# Patient Record
Sex: FEMALE | Employment: OTHER | URBAN - METROPOLITAN AREA
[De-identification: names, ages, dates, MRNs, and addresses within clinical notes are randomized per-mention and may not be internally consistent; named-entity substitution may affect disease eponyms.]

---

## 2017-09-26 ENCOUNTER — HOSPITAL ENCOUNTER (OUTPATIENT)
Dept: MAMMOGRAPHY | Age: 82
Discharge: HOME | End: 2017-09-26
Attending: SPECIALIST
Payer: MEDICARE

## 2017-09-26 DIAGNOSIS — M81.0 SENILE OSTEOPOROSIS: ICD-10-CM

## 2017-09-26 PROCEDURE — 77080 DXA BONE DENSITY AXIAL: CPT

## 2018-05-07 ENCOUNTER — DOCUMENTATION ONLY (OUTPATIENT)
Dept: NEUROLOGY | Age: 83
End: 2018-05-07

## 2018-05-07 ENCOUNTER — OFFICE VISIT (OUTPATIENT)
Dept: NEUROLOGY | Age: 83
End: 2018-05-07

## 2018-05-07 ENCOUNTER — TELEPHONE (OUTPATIENT)
Dept: NEUROLOGY | Age: 83
End: 2018-05-07

## 2018-05-07 VITALS
OXYGEN SATURATION: 98 % | SYSTOLIC BLOOD PRESSURE: 136 MMHG | BODY MASS INDEX: 19.15 KG/M2 | DIASTOLIC BLOOD PRESSURE: 58 MMHG | HEART RATE: 62 BPM | RESPIRATION RATE: 18 BRPM | WEIGHT: 122 LBS | HEIGHT: 67 IN

## 2018-05-07 DIAGNOSIS — R26.81 GAIT INSTABILITY: ICD-10-CM

## 2018-05-07 DIAGNOSIS — M21.371 FOOT DROP, BILATERAL: ICD-10-CM

## 2018-05-07 DIAGNOSIS — E11.8 CONTROLLED TYPE 2 DIABETES MELLITUS WITH COMPLICATION, WITHOUT LONG-TERM CURRENT USE OF INSULIN (HCC): ICD-10-CM

## 2018-05-07 DIAGNOSIS — R29.6 RECURRENT FALLS: Primary | ICD-10-CM

## 2018-05-07 DIAGNOSIS — R20.2 PARESTHESIA OF BOTH FEET: ICD-10-CM

## 2018-05-07 DIAGNOSIS — M21.372 FOOT DROP, BILATERAL: ICD-10-CM

## 2018-05-07 RX ORDER — METFORMIN HYDROCHLORIDE 500 MG/1
TABLET ORAL DAILY
COMMUNITY
End: 2019-12-16 | Stop reason: DRUGHIGH

## 2018-05-07 RX ORDER — DOCUSATE SODIUM 100 MG/1
100 CAPSULE, LIQUID FILLED ORAL 2 TIMES DAILY
COMMUNITY

## 2018-05-07 RX ORDER — NORTRIPTYLINE HYDROCHLORIDE 25 MG/1
25 CAPSULE ORAL
COMMUNITY
End: 2019-12-16 | Stop reason: DRUGHIGH

## 2018-05-07 RX ORDER — ATORVASTATIN CALCIUM 10 MG/1
10 TABLET, FILM COATED ORAL DAILY
COMMUNITY

## 2018-05-07 RX ORDER — LANOLIN ALCOHOL/MO/W.PET/CERES
1000 CREAM (GRAM) TOPICAL DAILY
COMMUNITY

## 2018-05-07 RX ORDER — ASPIRIN 81 MG/1
1 TABLET ORAL EVERY OTHER DAY
COMMUNITY

## 2018-05-07 RX ORDER — LORAZEPAM 0.5 MG/1
0.25 TABLET ORAL 2 TIMES DAILY
COMMUNITY

## 2018-05-07 RX ORDER — RALOXIFENE HYDROCHLORIDE 60 MG/1
60 TABLET, FILM COATED ORAL DAILY
COMMUNITY

## 2018-05-07 NOTE — TELEPHONE ENCOUNTER
----- Message from [employer] sent at 5/7/2018 12:11 PM EDT -----  Regarding: Dr. Sandee Man  Pt's daughter(Candace Lebron Murray) returned nurse call regarding scheduling an appt for an EMG. Best contact number [phone].

## 2018-05-07 NOTE — MR AVS SNAPSHOT
[place] [address] [address] [place] [address] 
[phone] Patient: Daphnie Hernandez MRN: [id number] :1935 Visit Information Date & Time Provider Department Dept. Phone Encounter #  
 2018  9:00 AM Freya Dubois DO [facility] at [address] [id number] Upcoming Health Maintenance Date Due DTaP/Tdap/Td series (1 - Tdap) 1956 ZOSTER VACCINE AGE 60> 1996 GLAUCOMA SCREENING Q2Y 2001 Pneumococcal 65+ Low/Medium Risk (1 of 2 - PCV13) 2001 MEDICARE YEARLY EXAM 3/20/2018 Influenza Age 5 to Adult 2018 Allergies as of 2018  Review Complete On: 2018 By: Freya Dubois,  No Known Allergies Current Immunizations  Never Reviewed No immunizations on file. Not reviewed this visit You Were Diagnosed With   
  
 Codes Comments Recurrent falls    -  Primary ICD-10-CM: R29.6 ICD-9-CM: V15.88 Gait instability     ICD-10-CM: R26.81 
ICD-9-CM: 908. 2 Paresthesia of both feet     ICD-10-CM: R20.2 ICD-9-CM: 782.0 Foot drop, bilateral     ICD-10-CM: M21.371, M21.372 ICD-9-CM: 736.79 Controlled type 2 diabetes mellitus with complication, without long-term current use of insulin (HCC)     ICD-10-CM: E11.8 ICD-9-CM: 250.90 Vitals BP Pulse Resp Height(growth percentile) Weight(growth percentile) SpO2  
 136/58 62 18 5' 7\" (1.702 m) 122 lb (55.3 kg) 98% BMI OB Status Smoking Status 19.11 kg/m2 Postmenopausal Never Smoker Vitals History BMI and BSA Data Body Mass Index Body Surface Area  
 19.11 kg/m 2 1.62 m 2 Your Updated Medication List  
  
   
This list is accurate as of 18  9:26 AM.  Always use your most recent med list.  
  
  
  
  
 ASPIRIN PO Take  by mouth every other day. ATIVAN 0.5 mg tablet Generic drug:  LORazepam  
 Take  by mouth two (2) times a day. atorvastatin 10 mg tablet Commonly known as:  LIPITOR Take  by mouth daily. BEANO PO Take  by mouth. CALCIUM 500 PO Take  by mouth.  
  
 cyanocobalamin 1,000 mcg tablet Take 1,000 mcg by mouth daily. docusate sodium 100 mg capsule Commonly known as:  Idella Razor Take 100 mg by mouth two (2) times a day. FIBERCON PO Take  by mouth. 2 tabs daily  
  
 metFORMIN 500 mg tablet Commonly known as:  GLUCOPHAGE Take  by mouth daily. miscellaneous medical supply Misc B/L carbon fiber AFO for foot drop  
  
 nortriptyline 25 mg capsule Commonly known as:  PAMELOR Take 25 mg by mouth nightly. 1 cap at dinner. 3 at bedtime. raloxifene 60 mg tablet Commonly known as:  EVISTA Take  by mouth daily. Estela Isaacs Walker with wheels/seat Prescriptions Printed Refills  
 miscellaneous medical supply misc 0 Sig: B/L carbon fiber AFO for foot drop Class: Print Walker misc 0 [place] with wheels/seat Class: Print We Performed the Following HEMOGLOBIN A1C WITH EAG [23925 CPT(R)] IMMUNOELECTROPHORESIS [facility].) [id number] CPT(R)] METHYLMALONIC ACID [62481 CPT(R)] REFERRAL TO PHYSICAL THERAPY [QNQ45 Custom] Comments:  
 Please evaluate patient for gait training, safety THYROID PANEL W/TSH [07818 CPT(R)] VITAMIN B12 [id number] CPT(R)] To-Do List   
 05/07/2018 Neurology:  EMG LIMITED Referral Information Referral ID Referred By Referred To  
  
 [id number] Yomaira BOWER Not Available Visits Status Start Date End Date 1 New Request 5/7/18 5/7/19 If your referral has a status of pending review or denied, additional information will be sent to support the outcome of this decision. Patient Instructions A Healthy Lifestyle: Care Instructions Your Care Instructions A healthy lifestyle can help you feel good, stay at a healthy weight, and have plenty of energy for both work and play. A healthy lifestyle is something you can share with your whole family. A healthy lifestyle also can lower your risk for serious health problems, such as high blood pressure, heart disease, and diabetes. You can follow a few steps listed below to improve your health and the health of your family. Follow-up care is a key part of your treatment and safety. Be sure to make and go to all appointments, and call your doctor if you are having problems. It's also a good idea to know your test results and keep a list of the medicines you take. How can you care for yourself at home? · Do not eat too much sugar, fat, or fast foods. You can still have dessert and treats now and then. The goal is moderation. · Start small to improve your eating habits. Pay attention to portion sizes, drink less juice and soda pop, and eat more fruits and vegetables. ¨ Eat a healthy amount of food. A 3-ounce serving of meat, for example, is about the size of a deck of cards. Fill the rest of your plate with vegetables and whole grains. ¨ Limit the amount of soda and sports drinks you have every day. Drink more water when you are thirsty. ¨ Eat at least 5 servings of fruits and vegetables every day. It may seem like a lot, but it is not hard to reach this goal. A serving or helping is 1 piece of fruit, 1 cup of vegetables, or 2 cups of leafy, raw vegetables. Have an apple or some carrot sticks as an afternoon snack instead of a candy bar. Try to have fruits and/or vegetables at every meal. 
· Make exercise part of your daily routine. You may want to start with simple activities, such as walking, bicycling, or slow swimming. Try to be active 30 to 60 minutes every day. You do not need to do all 30 to 60 minutes all at once.  For example, you can exercise 3 times a day for 10 or 20 minutes. Moderate exercise is safe for most people, but it is always a good idea to talk to your doctor before starting an exercise program. 
· Keep moving. Iris Zariine the lawn, work in the garden, or [employer]. Take the stairs instead of the elevator at work. · If you smoke, quit. People who smoke have an increased risk for heart attack, stroke, cancer, and other lung illnesses. Quitting is hard, but there are ways to boost your chance of quitting tobacco for good. ¨ Use nicotine gum, patches, or lozenges. ¨ Ask your doctor about stop-smoking programs and medicines. ¨ Keep trying. In addition to reducing your risk of diseases in the future, you will notice some benefits soon after you stop using tobacco. If you have shortness of breath or asthma symptoms, they will likely get better within a few weeks after you quit. · Limit how much alcohol you drink. Moderate amounts of alcohol (up to 2 drinks a day for men, 1 drink a day for women) are okay. But drinking too much can lead to liver problems, high blood pressure, and other health problems. Family health If you have a family, there are many things you can do together to improve your health. · Eat meals together as a family as often as possible. · Eat healthy foods. This includes fruits, vegetables, lean meats and dairy, and whole grains. · Include your family in your fitness plan. Most people think of activities such as jogging or tennis as the way to fitness, but there are many ways you and your family can be more active. Anything that makes you breathe hard and gets your heart pumping is exercise. Here are some tips: 
¨ Walk to do errands or to take your child to school or the bus. ¨ Go for a family bike ride after dinner instead of watching TV. Where can you learn more? Go to http://grayson-radha.info/. Enter O849 in the search box to learn more about \"A Healthy Lifestyle: Care Instructions. \" Current as of: May 12, 2017 Content Version: 11.4 © 2175-9521 Healthwise, [place]. Care instructions adapted under license by [employer] (which disclaims liability or warranty for this information). If you have questions about a medical condition or this instruction, always ask your healthcare professional. [address] any warranty or liability for your use of this information. Introducing [facility] HE[facility]! [facility] introduces [employer] patient portal. Now you can access parts of your medical record, email your doctor's office, and request medication refills online. 1. In your internet browser, go to https://[employer]. [employer] 2. Click on the First Time User? Click Here link in the Sign In box. You will see the New Member Sign Up page. 3. Enter your [employer] Access Code exactly as it appears below. You will not need to use this code after youve completed the sign-up process. If you do not sign up before the expiration date, you must request a new code. · [employer] Access Code: [email or web address] Expires: 8/5/2018  8:07 AM 
 
4. Enter the last four digits of your Social Security Number (xxxx) and Date of Birth (mm/dd/yyyy) as indicated and click Submit. You will be taken to the next sign-up page. 5. Create a [employer] ID. This will be your [employer] login ID and cannot be changed, so think of one that is secure and easy to remember. 6. Create a [employer] password. You can change your password at any time. 7. Enter your Password Reset Question and Answer. This can be used at a later time if you forget your password. 8. Enter your e-mail address. You will receive e-mail notification when new information is available in [address] 9. Click Sign Up. You can now view and download portions of your medical record. 10. Click the Download Summary menu link to download a portable copy of your medical information. If you have questions, please visit the Frequently Asked Questions section of the [employer] website. Remember, [employer] is NOT to be used for urgent needs. For medical emergencies, dial 911. Now available from your iPhone and Android! Please provide this summary of care documentation to your next provider. Your primary care clinician is listed as Ana Maria Rapp. If you have any questions after today's visit, please call [phone].

## 2018-05-07 NOTE — PATIENT INSTRUCTIONS
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
## 2018-05-09 LAB
EST. AVERAGE GLUCOSE BLD GHB EST-MCNC: 117 MG/DL
FT4I SERPL CALC-MCNC: 1.9 (ref 1.2–4.9)
HBA1C MFR BLD: 5.7 % (ref 4.8–5.6)
IGA SERPL-MCNC: 126 MG/DL (ref 64–422)
IGG SERPL-MCNC: 800 MG/DL (ref 700–1600)
IGM SERPL-MCNC: 40 MG/DL (ref 26–217)
METHYLMALONATE SERPL-SCNC: 107 NMOL/L (ref 0–378)
PROT PATTERN SERPL IFE-IMP: NORMAL
T3RU NFR SERPL: 29 % (ref 24–39)
T4 SERPL-MCNC: 6.4 UG/DL (ref 4.5–12)
TSH SERPL DL<=0.005 MIU/L-ACNC: 2.99 UIU/ML (ref 0.45–4.5)
VIT B12 SERPL-MCNC: >2000 PG/ML (ref 232–1245)

## 2018-05-17 ENCOUNTER — TELEPHONE (OUTPATIENT)
Dept: NEUROLOGY | Age: 83
End: 2018-05-17

## 2018-05-17 NOTE — TELEPHONE ENCOUNTER
Pt's daughter calling records sent to Dr. Jenny Prasad at [facility]. Fax: [phone]  Phone number [phone]

## 2018-05-18 NOTE — TELEPHONE ENCOUNTER
----- Message from Ami Palomino sent at 5/18/2018 10:09 AM EDT -----  Regarding: Dr Bishop Birch  Pt's daughter Mario Spring c) [phone], said that her mother needs a walker with a seat and  she said she found a company called [employer] p) [phone], also can call them for fax number, that can supply her mother walker with the seat, they just would need recent  office sent to them. Pt said she will need this as soon as possible  due to the falls her mother has been having, Pt daughter she can also stop by today and pick them up , if ready today, if you call her when ready.   Also gave permission to leave a voice mail message      will call back with the fax number if needed,

## 2019-10-02 ENCOUNTER — HOSPITAL ENCOUNTER (OUTPATIENT)
Dept: CT IMAGING | Age: 84
Discharge: HOME | End: 2019-10-02
Attending: SPECIALIST
Payer: MEDICARE

## 2019-10-02 DIAGNOSIS — R41.0 ACUTE CONFUSION: ICD-10-CM

## 2019-10-02 DIAGNOSIS — R29.6 FREQUENT FALLS: ICD-10-CM

## 2019-10-02 LAB — CREAT BLD-MCNC: 0.6 MG/DL (ref 0.6–1.3)

## 2019-10-02 PROCEDURE — 74011636320 HC RX REV CODE- 636/320: Performed by: SPECIALIST

## 2019-10-02 PROCEDURE — 70470 CT HEAD/BRAIN W/O & W/DYE: CPT

## 2019-10-02 PROCEDURE — 82565 ASSAY OF CREATININE: CPT

## 2019-10-02 RX ORDER — SODIUM CHLORIDE 0.9 % (FLUSH) 0.9 %
10 SYRINGE (ML) INJECTION
Status: COMPLETED | OUTPATIENT
Start: 2019-10-02 | End: 2019-10-02

## 2019-10-02 RX ADMIN — IOPAMIDOL 100 ML: 755 INJECTION, SOLUTION INTRAVENOUS at 13:14

## 2019-10-02 RX ADMIN — Medication 10 ML: at 13:14

## 2019-12-13 ENCOUNTER — HOSPITAL ENCOUNTER (OUTPATIENT)
Dept: VASCULAR SURGERY | Age: 84
Discharge: HOME | DRG: 638 | End: 2019-12-13
Attending: SPECIALIST
Payer: MEDICARE

## 2019-12-13 DIAGNOSIS — M79.606 LEG PAIN: ICD-10-CM

## 2019-12-13 DIAGNOSIS — R22.43 LOCALIZED SWELLING, MASS AND LUMP, LOWER LIMB, BILATERAL: ICD-10-CM

## 2019-12-13 DIAGNOSIS — L53.9 ERYTHEMATOUS CONDITION: ICD-10-CM

## 2019-12-13 PROCEDURE — 93970 EXTREMITY STUDY: CPT

## 2019-12-16 ENCOUNTER — HOSPITAL ENCOUNTER (INPATIENT)
Age: 84
LOS: 8 days | Discharge: NURSING, REHAB OR LONG-TERM CARE | DRG: 638 | End: 2019-12-24
Attending: EMERGENCY MEDICINE | Admitting: INTERNAL MEDICINE
Payer: MEDICARE

## 2019-12-16 ENCOUNTER — APPOINTMENT (OUTPATIENT)
Dept: GENERAL RADIOLOGY | Age: 84
DRG: 638 | End: 2019-12-16
Attending: EMERGENCY MEDICINE
Payer: MEDICARE

## 2019-12-16 DIAGNOSIS — R60.0 BILATERAL LEG EDEMA: ICD-10-CM

## 2019-12-16 DIAGNOSIS — L97.519 ULCER OF RIGHT FOOT, UNSPECIFIED ULCER STAGE (HCC): ICD-10-CM

## 2019-12-16 DIAGNOSIS — L03.116 BILATERAL LOWER LEG CELLULITIS: Primary | ICD-10-CM

## 2019-12-16 DIAGNOSIS — L03.115 BILATERAL LOWER LEG CELLULITIS: Primary | ICD-10-CM

## 2019-12-16 DIAGNOSIS — E11.65 UNCONTROLLED TYPE 2 DIABETES MELLITUS WITH HYPERGLYCEMIA (HCC): ICD-10-CM

## 2019-12-16 DIAGNOSIS — I87.2 VENOUS INSUFFICIENCY: ICD-10-CM

## 2019-12-16 LAB
ALBUMIN SERPL-MCNC: 3 G/DL (ref 3.5–5)
ALBUMIN/GLOB SERPL: 0.8 {RATIO} (ref 1.1–2.2)
ALP SERPL-CCNC: 66 U/L (ref 45–117)
ALT SERPL-CCNC: 32 U/L (ref 12–78)
ANION GAP SERPL CALC-SCNC: 1 MMOL/L (ref 5–15)
AST SERPL-CCNC: 34 U/L (ref 15–37)
BASOPHILS # BLD: 0.1 K/UL (ref 0–0.1)
BASOPHILS NFR BLD: 1 % (ref 0–1)
BILIRUB SERPL-MCNC: 0.3 MG/DL (ref 0.2–1)
BNP SERPL-MCNC: 458 PG/ML
BUN SERPL-MCNC: 26 MG/DL (ref 6–20)
BUN/CREAT SERPL: 27 (ref 12–20)
CALCIUM SERPL-MCNC: 8.7 MG/DL (ref 8.5–10.1)
CHLORIDE SERPL-SCNC: 102 MMOL/L (ref 97–108)
CO2 SERPL-SCNC: 31 MMOL/L (ref 21–32)
CREAT SERPL-MCNC: 0.96 MG/DL (ref 0.55–1.02)
DIFFERENTIAL METHOD BLD: ABNORMAL
EOSINOPHIL # BLD: 1.3 K/UL (ref 0–0.4)
EOSINOPHIL NFR BLD: 12 % (ref 0–7)
ERYTHROCYTE [DISTWIDTH] IN BLOOD BY AUTOMATED COUNT: 13.2 % (ref 11.5–14.5)
GLOBULIN SER CALC-MCNC: 3.7 G/DL (ref 2–4)
GLUCOSE BLD STRIP.AUTO-MCNC: 139 MG/DL (ref 65–100)
GLUCOSE SERPL-MCNC: 102 MG/DL (ref 65–100)
HCT VFR BLD AUTO: 35.1 % (ref 35–47)
HGB BLD-MCNC: 11.2 G/DL (ref 11.5–16)
IMM GRANULOCYTES # BLD AUTO: 0 K/UL (ref 0–0.04)
IMM GRANULOCYTES NFR BLD AUTO: 0 % (ref 0–0.5)
LACTATE BLD-SCNC: 0.69 MMOL/L (ref 0.4–2)
LYMPHOCYTES # BLD: 1.9 K/UL (ref 0.8–3.5)
LYMPHOCYTES NFR BLD: 18 % (ref 12–49)
MAGNESIUM SERPL-MCNC: 2.2 MG/DL (ref 1.6–2.4)
MCH RBC QN AUTO: 32 PG (ref 26–34)
MCHC RBC AUTO-ENTMCNC: 31.9 G/DL (ref 30–36.5)
MCV RBC AUTO: 100.3 FL (ref 80–99)
MONOCYTES # BLD: 1.1 K/UL (ref 0–1)
MONOCYTES NFR BLD: 10 % (ref 5–13)
NEUTS SEG # BLD: 6.4 K/UL (ref 1.8–8)
NEUTS SEG NFR BLD: 59 % (ref 32–75)
NRBC # BLD: 0 K/UL (ref 0–0.01)
NRBC BLD-RTO: 0 PER 100 WBC
PLATELET # BLD AUTO: 393 K/UL (ref 150–400)
PMV BLD AUTO: 9.3 FL (ref 8.9–12.9)
POTASSIUM SERPL-SCNC: 4.5 MMOL/L (ref 3.5–5.1)
PROT SERPL-MCNC: 6.7 G/DL (ref 6.4–8.2)
RBC # BLD AUTO: 3.5 M/UL (ref 3.8–5.2)
RBC MORPH BLD: ABNORMAL
SERVICE CMNT-IMP: ABNORMAL
SODIUM SERPL-SCNC: 134 MMOL/L (ref 136–145)
WBC # BLD AUTO: 10.8 K/UL (ref 3.6–11)

## 2019-12-16 PROCEDURE — 74011250636 HC RX REV CODE- 250/636: Performed by: INTERNAL MEDICINE

## 2019-12-16 PROCEDURE — 82962 GLUCOSE BLOOD TEST: CPT

## 2019-12-16 PROCEDURE — 74011000258 HC RX REV CODE- 258: Performed by: EMERGENCY MEDICINE

## 2019-12-16 PROCEDURE — 74011250637 HC RX REV CODE- 250/637: Performed by: INTERNAL MEDICINE

## 2019-12-16 PROCEDURE — 36415 COLL VENOUS BLD VENIPUNCTURE: CPT

## 2019-12-16 PROCEDURE — 83605 ASSAY OF LACTIC ACID: CPT

## 2019-12-16 PROCEDURE — 74011000258 HC RX REV CODE- 258: Performed by: INTERNAL MEDICINE

## 2019-12-16 PROCEDURE — 85025 COMPLETE CBC W/AUTO DIFF WBC: CPT

## 2019-12-16 PROCEDURE — 80053 COMPREHEN METABOLIC PANEL: CPT

## 2019-12-16 PROCEDURE — 71046 X-RAY EXAM CHEST 2 VIEWS: CPT

## 2019-12-16 PROCEDURE — 74011250636 HC RX REV CODE- 250/636: Performed by: EMERGENCY MEDICINE

## 2019-12-16 PROCEDURE — 96374 THER/PROPH/DIAG INJ IV PUSH: CPT

## 2019-12-16 PROCEDURE — 83735 ASSAY OF MAGNESIUM: CPT

## 2019-12-16 PROCEDURE — 83880 ASSAY OF NATRIURETIC PEPTIDE: CPT

## 2019-12-16 PROCEDURE — 65660000000 HC RM CCU STEPDOWN

## 2019-12-16 PROCEDURE — 87040 BLOOD CULTURE FOR BACTERIA: CPT

## 2019-12-16 PROCEDURE — 99284 EMERGENCY DEPT VISIT MOD MDM: CPT

## 2019-12-16 RX ORDER — TORSEMIDE 20 MG/1
5 TABLET ORAL EVERY EVENING
Status: DISCONTINUED | OUTPATIENT
Start: 2019-12-16 | End: 2019-12-24 | Stop reason: HOSPADM

## 2019-12-16 RX ORDER — METFORMIN HYDROCHLORIDE 500 MG/1
500 TABLET, FILM COATED, EXTENDED RELEASE ORAL DAILY
COMMUNITY
End: 2019-12-24

## 2019-12-16 RX ORDER — INSULIN LISPRO 100 [IU]/ML
INJECTION, SOLUTION INTRAVENOUS; SUBCUTANEOUS
Status: DISCONTINUED | OUTPATIENT
Start: 2019-12-16 | End: 2019-12-18

## 2019-12-16 RX ORDER — LORAZEPAM 0.5 MG/1
0.25 TABLET ORAL 2 TIMES DAILY
Status: DISCONTINUED | OUTPATIENT
Start: 2019-12-16 | End: 2019-12-24 | Stop reason: HOSPADM

## 2019-12-16 RX ORDER — CALCIUM CARBONATE 500(1250)
500 TABLET ORAL DAILY
Status: DISCONTINUED | OUTPATIENT
Start: 2019-12-17 | End: 2019-12-24 | Stop reason: HOSPADM

## 2019-12-16 RX ORDER — SULFAMETHOXAZOLE AND TRIMETHOPRIM 800; 160 MG/1; MG/1
1 TABLET ORAL 2 TIMES DAILY
COMMUNITY

## 2019-12-16 RX ORDER — LISINOPRIL 40 MG/1
40 TABLET ORAL DAILY
Status: DISCONTINUED | OUTPATIENT
Start: 2019-12-17 | End: 2019-12-24 | Stop reason: HOSPADM

## 2019-12-16 RX ORDER — DOCUSATE SODIUM 100 MG/1
100 CAPSULE, LIQUID FILLED ORAL 2 TIMES DAILY
Status: DISCONTINUED | OUTPATIENT
Start: 2019-12-16 | End: 2019-12-24 | Stop reason: HOSPADM

## 2019-12-16 RX ORDER — LISINOPRIL 20 MG/1
40 TABLET ORAL DAILY
COMMUNITY

## 2019-12-16 RX ORDER — RISPERIDONE 0.25 MG/1
0.25 TABLET, FILM COATED ORAL EVERY OTHER DAY
Status: DISCONTINUED | OUTPATIENT
Start: 2019-12-17 | End: 2019-12-24 | Stop reason: HOSPADM

## 2019-12-16 RX ORDER — DOCUSATE SODIUM 100 MG/1
100 CAPSULE, LIQUID FILLED ORAL
Status: DISCONTINUED | OUTPATIENT
Start: 2019-12-16 | End: 2019-12-24 | Stop reason: HOSPADM

## 2019-12-16 RX ORDER — CALCIUM POLYCARBOPHIL 625 MG
1 TABLET ORAL 2 TIMES DAILY
Status: DISCONTINUED | OUTPATIENT
Start: 2019-12-16 | End: 2019-12-24 | Stop reason: HOSPADM

## 2019-12-16 RX ORDER — HEPARIN SODIUM 5000 [USP'U]/ML
5000 INJECTION, SOLUTION INTRAVENOUS; SUBCUTANEOUS EVERY 12 HOURS
Status: DISCONTINUED | OUTPATIENT
Start: 2019-12-16 | End: 2019-12-24 | Stop reason: HOSPADM

## 2019-12-16 RX ORDER — RISPERIDONE 0.25 MG/1
0.25 TABLET, FILM COATED ORAL EVERY OTHER DAY
COMMUNITY

## 2019-12-16 RX ORDER — SODIUM CHLORIDE 0.9 % (FLUSH) 0.9 %
5-40 SYRINGE (ML) INJECTION EVERY 8 HOURS
Status: DISCONTINUED | OUTPATIENT
Start: 2019-12-16 | End: 2019-12-24 | Stop reason: HOSPADM

## 2019-12-16 RX ORDER — ASPIRIN 81 MG/1
81 TABLET ORAL EVERY OTHER DAY
Status: DISCONTINUED | OUTPATIENT
Start: 2019-12-17 | End: 2019-12-24 | Stop reason: HOSPADM

## 2019-12-16 RX ORDER — ACETAMINOPHEN 325 MG/1
650 TABLET ORAL
Status: DISCONTINUED | OUTPATIENT
Start: 2019-12-16 | End: 2019-12-24 | Stop reason: HOSPADM

## 2019-12-16 RX ORDER — ONDANSETRON 2 MG/ML
4 INJECTION INTRAMUSCULAR; INTRAVENOUS
Status: DISCONTINUED | OUTPATIENT
Start: 2019-12-16 | End: 2019-12-24 | Stop reason: HOSPADM

## 2019-12-16 RX ORDER — CEPHALEXIN 500 MG/1
500 CAPSULE ORAL 2 TIMES DAILY
COMMUNITY
End: 2019-12-24

## 2019-12-16 RX ORDER — NORTRIPTYLINE HYDROCHLORIDE 25 MG/1
75 CAPSULE ORAL
Status: DISCONTINUED | OUTPATIENT
Start: 2019-12-16 | End: 2019-12-24 | Stop reason: HOSPADM

## 2019-12-16 RX ORDER — DEXTROSE MONOHYDRATE 25 G/50ML
12.5-25 INJECTION, SOLUTION INTRAVENOUS AS NEEDED
Status: DISCONTINUED | OUTPATIENT
Start: 2019-12-16 | End: 2019-12-24 | Stop reason: HOSPADM

## 2019-12-16 RX ORDER — ATORVASTATIN CALCIUM 10 MG/1
10 TABLET, FILM COATED ORAL DAILY
Status: DISCONTINUED | OUTPATIENT
Start: 2019-12-17 | End: 2019-12-24 | Stop reason: HOSPADM

## 2019-12-16 RX ORDER — NORTRIPTYLINE HYDROCHLORIDE 25 MG/1
25 CAPSULE ORAL
Status: DISCONTINUED | OUTPATIENT
Start: 2019-12-16 | End: 2019-12-24 | Stop reason: HOSPADM

## 2019-12-16 RX ORDER — TORSEMIDE 5 MG/1
5 TABLET ORAL EVERY EVENING
COMMUNITY

## 2019-12-16 RX ORDER — SODIUM CHLORIDE 0.9 % (FLUSH) 0.9 %
5-40 SYRINGE (ML) INJECTION AS NEEDED
Status: DISCONTINUED | OUTPATIENT
Start: 2019-12-16 | End: 2019-12-24 | Stop reason: HOSPADM

## 2019-12-16 RX ORDER — HEPARIN SODIUM 5000 [USP'U]/ML
5000 INJECTION, SOLUTION INTRAVENOUS; SUBCUTANEOUS EVERY 8 HOURS
Status: DISCONTINUED | OUTPATIENT
Start: 2019-12-16 | End: 2019-12-16

## 2019-12-16 RX ORDER — NORTRIPTYLINE HYDROCHLORIDE 25 MG/1
75 CAPSULE ORAL
COMMUNITY

## 2019-12-16 RX ORDER — MAGNESIUM SULFATE 100 %
4 CRYSTALS MISCELLANEOUS AS NEEDED
Status: DISCONTINUED | OUTPATIENT
Start: 2019-12-16 | End: 2019-12-24 | Stop reason: HOSPADM

## 2019-12-16 RX ORDER — NORTRIPTYLINE HYDROCHLORIDE 25 MG/1
25 CAPSULE ORAL
COMMUNITY

## 2019-12-16 RX ORDER — LANOLIN ALCOHOL/MO/W.PET/CERES
1000 CREAM (GRAM) TOPICAL DAILY
Status: DISCONTINUED | OUTPATIENT
Start: 2019-12-17 | End: 2019-12-24 | Stop reason: HOSPADM

## 2019-12-16 RX ORDER — RALOXIFENE HYDROCHLORIDE 60 MG/1
60 TABLET, FILM COATED ORAL DAILY
Status: DISCONTINUED | OUTPATIENT
Start: 2019-12-17 | End: 2019-12-24 | Stop reason: HOSPADM

## 2019-12-16 RX ADMIN — HEPARIN SODIUM 5000 UNITS: 5000 INJECTION INTRAVENOUS; SUBCUTANEOUS at 20:14

## 2019-12-16 RX ADMIN — CALCIUM POLYCARBOPHIL 625 MG: 625 TABLET, FILM COATED ORAL at 22:07

## 2019-12-16 RX ADMIN — TORSEMIDE 5 MG: 20 TABLET ORAL at 20:12

## 2019-12-16 RX ADMIN — AMPICILLIN SODIUM AND SULBACTAM SODIUM 3 G: 2; 1 INJECTION, POWDER, FOR SOLUTION INTRAMUSCULAR; INTRAVENOUS at 22:35

## 2019-12-16 RX ADMIN — Medication 10 ML: at 22:08

## 2019-12-16 RX ADMIN — VANCOMYCIN HYDROCHLORIDE 1000 MG: 1 INJECTION, POWDER, LYOPHILIZED, FOR SOLUTION INTRAVENOUS at 18:31

## 2019-12-16 RX ADMIN — DOCUSATE SODIUM 100 MG: 100 CAPSULE, LIQUID FILLED ORAL at 20:14

## 2019-12-16 RX ADMIN — NORTRIPTYLINE HYDROCHLORIDE 75 MG: 25 CAPSULE ORAL at 22:08

## 2019-12-16 RX ADMIN — AMPICILLIN SODIUM AND SULBACTAM SODIUM 3 G: 2; 1 INJECTION, POWDER, FOR SOLUTION INTRAMUSCULAR; INTRAVENOUS at 16:30

## 2019-12-16 RX ADMIN — LORAZEPAM 0.25 MG: 0.5 TABLET ORAL at 18:29

## 2019-12-16 RX ADMIN — SODIUM CHLORIDE 1000 ML: 900 INJECTION, SOLUTION INTRAVENOUS at 15:36

## 2019-12-16 RX ADMIN — NORTRIPTYLINE HYDROCHLORIDE 25 MG: 25 CAPSULE ORAL at 20:14

## 2019-12-16 NOTE — ED NOTES
TRANSFER - OUT REPORT:    Verbal report given to reema(name) on Jeanna Tnea  being transferred to gen surg(unit) for routine progression of care       Report consisted of patients Situation, Background, Assessment and   Recommendations(SBAR). Information from the following report(s) SBAR, ED Summary and MAR was reviewed with the receiving nurse. Lines:       Opportunity for questions and clarification was provided.       Patient transported with:   [employer]

## 2019-12-16 NOTE — PROGRESS NOTES
Pharmacy Automatic Renal Dosing Protocol - Antimicrobials    Indication for Antimicrobials: skin and soft tissue     Current Regimen of Each Antimicrobial:  Unasyn 3g IV every 8hr (Start Date ; Day # )  Vancomycin pharmacy to dose (start date: , day 1)    Previous Antimicrobial Therapy:    Goal Level: VANCOMYCIN TROUGH GOAL RANGE    Vancomycin Trough: 10 - 15 mcg/mL  (AUC: 400 - 600 mg/hr/Liter/day)     Date Dose & Interval Measured (mcg/mL) Extrapolated (mcg/mL)                       Date & time of next level: before 3rd maintenance dose    Significant Cultures:   : paired blood - pending    Radiology / Imaging results: (X-ray, CT scan or MRI):   : chest xray: no acute process    Paralysis, amputations, malnutrition: none    Labs:  Recent Labs     19  1514   CREA 0.96   BUN 26*   WBC 10.8     Temp (24hrs), Av.8 °F (36.6 °C), Min:97.8 °F (36.6 °C), Max:97.8 °F (36.6 °C)    Creatinine Clearance (mL/min) or Dialysis: ~37ml/min    Impression/Plan:   Continue unasyn per renal above for renal function and indication  Cbc and bmp  Antimicrobial stop date 7 days      Pharmacy will follow daily and adjust medications as appropriate for renal function and/or serum levels. Thank you,  MACKENZIE Gray    Recommended duration of therapy  [email or web address]/[place]/[facility]/Pharmacy/Clinical%20Companion/Duration%20of%20ABX%20therapy. docx    Renal Dosing  http://[facility]/[place]/[facility]/Pharmacy/Clinical%20Companion/Renal%[email or web address]. pdf

## 2019-12-16 NOTE — ED PROVIDER NOTES
EMERGENCY DEPARTMENT HISTORY AND PHYSICAL EXAM      Please note that this dictation was completed with [employer], the computer voice recognition software. Quite often unanticipated grammatical, syntax, homophones, and other interpretive errors are inadvertently transcribed by the computer software. Please disregard these errors and any errors that have escaped final proofreading. Thank you. Date: 12/16/2019  Patient Name: Sheila Kuhn  Patient Age and Sex: 81 y.o. female    History of Presenting Illness     Chief Complaint   Patient presents with    Wound Check     pts daughter reports pt has swelling in bilateral legs with wounds, had a doppler last week    Leg Swelling       History Provided By: Patient and daughter    HPI: Sheila Kuhn, 81 y.o. female with past medical history as documented below presents to the ED with c/o of one week of progressive moderate to severe bilateral lower extremity edema. Pt also reports associated redness, warmth and burning pain. Pt reports seeing her wound care doc, Dr. Rick Ferreira recently. She had recently DVT ultrasounds that were negative. She was given a Rx for Bactrim and Keflex for possible cellulitis and has been taking them thus far for about a few days without relief of sx's. Additionally, she was also prescribed torsemide 5mg daily for her leg edema. She notes a history of \"foot drop\" and has been wearing a prosthesis device that resulted in wounds in her feet. She states her pain is painful to touch and currently at 5/10 pain. Pt denies any other alleviating or exacerbating factors. Additionally, pt specifically denies any recent fever, chills, headache, nausea, vomiting, abdominal pain, CP, SOB, lightheadedness, dizziness, numbness, weakness, heart palpitations, urinary sxs, diarrhea, constipation, melena, hematochezia, cough, or congestion. There are no other complaints, changes or physical findings at this time.      PCP: Gisele Hoffman MD    Past History Past Medical History:  Past Medical History:   Diagnosis Date    Depression     Diabetes ([facility] [address]     Hypercholesteremia        Past Surgical History:  Denies    Family History:  Family History   Problem Relation Age of Onset    Dementia Maternal Grandmother        Social History:  Social History     Tobacco Use    Smoking status: Never Smoker    Smokeless tobacco: Never Used   Substance Use Topics    Alcohol use: No    Drug use: Not on file       Allergies:  No Known Allergies    Current Medications:  No current facility-administered medications on file prior to encounter. Current Outpatient Medications on File Prior to Encounter   Medication Sig Dispense Refill    metFORMIN (GLUMETZA ER) 500 mg TG24 24 hour tablet Take 500 mg by mouth daily.  nortriptyline (PAMELOR) 25 mg capsule Take 25 mg by mouth daily (with dinner).  nortriptyline (PAMELOR) 25 mg capsule Take 75 mg by mouth nightly.  lisinopril (PRINIVIL, ZESTRIL) 20 mg tablet Take 40 mg by mouth daily.  risperiDONE (RISPERDAL) 0.25 mg tablet Take 0.25 mg by mouth every other day.  torsemide (DEMADEX) 5 mg tablet Take 5 mg by mouth every evening.  cephALEXin (KEFLEX) 500 mg capsule Take 500 mg by mouth two (2) times a day.  trimethoprim-sulfamethoxazole (BACTRIM DS) 160-800 mg per tablet Take 1 Tab by mouth two (2) times a day.  LORazepam (ATIVAN) 0.5 mg tablet Take 0.25 mg by mouth two (2) times a day.  raloxifene (EVISTA) 60 mg tablet Take 60 mg by mouth daily.  calcium carbonate (CALCIUM 500 PO) Take 1 Tab by mouth daily.  docusate sodium (COLACE) 100 mg capsule Take 100 mg by mouth two (2) times a day.  aspirin delayed-release 81 mg tablet Take 1 Tab by mouth every other day.  calcium polycarbophil (FIBERCON PO) Take 1 Tab by mouth two (2) times a day.  cyanocobalamin 1,000 mcg tablet Take 1,000 mcg by mouth daily.       alpha-D-galactosidase (BEANO PO) Take 1 Tab by mouth daily.      atorvastatin (LIPITOR) 10 mg tablet Take 10 mg by mouth daily.  [DISCONTINUED] nortriptyline (PAMELOR) 25 mg capsule Take 25 mg by mouth nightly. 1 cap at dinner. 3 at bedtime.  [DISCONTINUED] metFORMIN (GLUCOPHAGE) 500 mg tablet Take  by mouth daily.  [DISCONTINUED] miscellaneous medical supply [facility] carbon fiber AFO for foot drop 2 Each 0    [DISCONTINUED] Walker misc Walker with wheels/seat 1 Each 0       Review of Systems   Review of Systems   Constitutional: Negative. Negative for chills and fever. HENT: Negative. Negative for congestion, facial swelling, rhinorrhea, sore throat, trouble swallowing and voice change. Eyes: Negative. Respiratory: Negative. Negative for apnea, cough, chest tightness, shortness of breath and wheezing. Cardiovascular: Positive for leg swelling. Negative for chest pain and palpitations. Gastrointestinal: Negative. Negative for abdominal distention, abdominal pain, blood in stool, constipation, diarrhea, nausea and vomiting. Endocrine: Negative. Negative for cold intolerance, heat intolerance and polyuria. Genitourinary: Negative. Negative for difficulty urinating, dysuria, flank pain, frequency, hematuria and urgency. Musculoskeletal: Negative. Negative for arthralgias, back pain, myalgias, neck pain and neck stiffness. Skin: Positive for rash and wound. Negative for color change. Neurological: Negative. Negative for dizziness, syncope, facial asymmetry, speech difficulty, weakness, light-headedness, numbness and headaches. Hematological: Negative. Does not bruise/bleed easily. Psychiatric/Behavioral: Negative. Negative for confusion and self-injury. The patient is not nervous/anxious. Physical Exam   Physical Exam  Vitals signs and nursing note reviewed. Constitutional:       General: She is not in acute distress. Appearance: She is well-developed. She is not diaphoretic.    HENT:      Head: Normocephalic and atraumatic. Mouth/Throat:      Pharynx: No oropharyngeal exudate. Eyes:      Conjunctiva/sclera: Conjunctivae normal.      Pupils: Pupils are equal, round, and reactive to light. Neck:      Musculoskeletal: Normal range of motion. Cardiovascular:      Rate and Rhythm: Normal rate and regular rhythm. Heart sounds: Normal heart sounds. No murmur. No friction rub. No gallop. Pulmonary:      Effort: Pulmonary effort is normal. No respiratory distress. Breath sounds: Normal breath sounds. No wheezing or rales. Chest:      Chest wall: No tenderness. Abdominal:      General: Bowel sounds are normal. There is no distension. Palpations: Abdomen is soft. There is no mass. Tenderness: There is no tenderness. There is no guarding or rebound. Musculoskeletal: Normal range of motion. General: Swelling present. No tenderness or deformity. Skin:     General: Skin is warm. Findings: Erythema (+2 edema BLE, erythema noted to distal legs, skin sloughing) present. No rash. Neurological:      Mental Status: She is alert and oriented to person, place, and time. Cranial Nerves: No cranial nerve deficit. Motor: No abnormal muscle tone.       Coordination: Coordination normal.      Deep Tendon Reflexes: Reflexes normal.         Diagnostic Study Results     Labs -  Recent Results (from the past 24 hour(s))   POC LACTIC ACID    Collection Time: 12/16/19  3:09 PM   Result Value Ref Range    Lactic Acid (POC) 0.69 0.40 - 2.00 mmol/L   CBC WITH AUTOMATED DIFF    Collection Time: 12/16/19  3:14 PM   Result Value Ref Range    WBC 10.8 3.6 - 11.0 K/uL    RBC 3.50 (L) 3.80 - 5.20 M/uL    HGB 11.2 (L) 11.5 - 16.0 g/dL    HCT 35.1 35.0 - 47.0 %    .3 (H) 80.0 - 99.0 FL    MCH 32.0 26.0 - 34.0 PG    MCHC 31.9 30.0 - 36.5 g/dL    RDW 13.2 11.5 - 14.5 %    PLATELET 097 634 - 845 K/uL    MPV 9.3 8.9 - 12.9 FL    NRBC 0.0 0  WBC    ABSOLUTE NRBC 0.00 0.00 - 0.01 K/uL    NEUTROPHILS 59 32 - 75 %    LYMPHOCYTES 18 12 - 49 %    MONOCYTES 10 5 - 13 %    EOSINOPHILS 12 (H) 0 - 7 %    BASOPHILS 1 0 - 1 %    IMMATURE GRANULOCYTES 0 0.0 - 0.5 %    ABS. NEUTROPHILS 6.4 1.8 - 8.0 K/UL    ABS. LYMPHOCYTES 1.9 0.8 - 3.5 K/UL    ABS. MONOCYTES 1.1 (H) 0.0 - 1.0 K/UL    ABS. EOSINOPHILS 1.3 (H) 0.0 - 0.4 K/UL    ABS. BASOPHILS 0.1 0.0 - 0.1 K/UL    ABS. IMM. GRANS. 0.0 0.00 - 0.04 K/UL    DF AUTOMATED      RBC COMMENTS NORMOCYTIC, NORMOCHROMIC     METABOLIC PANEL, COMPREHENSIVE    Collection Time: 12/16/19  3:14 PM   Result Value Ref Range    Sodium 134 (L) 136 - 145 mmol/L    Potassium 4.5 3.5 - 5.1 mmol/L    Chloride 102 97 - 108 mmol/L    CO2 31 21 - 32 mmol/L    Anion gap 1 (L) 5 - 15 mmol/L    Glucose 102 (H) 65 - 100 mg/dL    BUN 26 (H) 6 - 20 MG/DL    Creatinine 0.96 0.55 - 1.02 MG/DL    BUN/Creatinine ratio 27 (H) 12 - 20      GFR est AA >60 >60 ml/min/1.73m2    GFR est non-AA 56 (L) >60 ml/min/1.73m2    Calcium 8.7 8.5 - 10.1 MG/DL    Bilirubin, total 0.3 0.2 - 1.0 MG/DL    ALT (SGPT) 32 12 - 78 U/L    AST (SGOT) 34 15 - 37 U/L    Alk. phosphatase 66 45 - 117 U/L    Protein, total 6.7 6.4 - 8.2 g/dL    Albumin 3.0 (L) 3.5 - 5.0 g/dL    Globulin 3.7 2.0 - 4.0 g/dL    A-G Ratio 0.8 (L) 1.1 - 2.2     NT-PRO BNP    Collection Time: 12/16/19  3:14 PM   Result Value Ref Range    NT pro- (H) <450 PG/ML   MAGNESIUM    Collection Time: 12/16/19  3:14 PM   Result Value Ref Range    Magnesium 2.2 1.6 - 2.4 mg/dL   GLUCOSE, POC    Collection Time: 12/16/19  9:11 PM   Result Value Ref Range    Glucose (POC) 139 (H) 65 - 100 mg/dL    Performed by Lexie Rodgers        Radiologic Studies -   XR CHEST PA LAT   Final Result   IMPRESSION:   No acute process. CT Results  (Last 48 hours)    None        CXR Results  (Last 48 hours)               12/16/19 1452  XR CHEST PA LAT Final result    Impression:  IMPRESSION:   No acute process.             Narrative:  INDICATION: Bilateral leg swelling, possible pulmonary edema        EXAM:  PA and Lateral Chest Radiographs       COMPARISON: None       FINDINGS:       PA and lateral views of the chest demonstrate a normal cardiomediastinal   silhouette. The lungs are adequately expanded. There is no edema, effusion,   consolidation, or pneumothorax. There is an age-indeterminate compression   fracture at the thoracolumbar junction. Medical Decision Making   I am the first provider for this patient. I reviewed the vital signs, available nursing notes, past medical history, past surgical history, family history and social history. Vital Signs-Reviewed the patient's vital signs. Patient Vitals for the past 24 hrs:   Temp Pulse Resp BP SpO2   12/16/19 2230 98.4 °F (36.9 °C) 67 18 122/55 100 %   12/16/19 1945 97.8 °F (36.6 °C) 66 18 106/57 99 %   12/16/19 1800 96.7 °F (35.9 °C)   131/52    12/16/19 1600  60  162/42    12/16/19 1326 97.8 °F (36.6 °C) 73 18 (!) 132/92 95 %     Pulse Oximetry Analysis - 95% on RA    Cardiac Monitor:   Rate: 73 bpm  Rhythm: Normal Sinus Rhythm      Records Reviewed: Nursing Notes, Old Medical Records, Previous electrocardiograms, Previous Radiology Studies and Previous Laboratory Studies    Provider Notes (Medical Decision Making):   DDx: cellulitis, PVD, failure of outpatient Rx, lymphedema, less likely DVT, CHF    ED Course:   Initial assessment performed. The patients presenting problems have been discussed, and they are in agreement with the care plan formulated and outlined with them. I have encouraged them to ask questions as they arise throughout their visit. I reviewed our electronic medical record system for any past medical records that were available that may contribute to the patient's current condition, the nursing notes and vital signs from today's visit.   Shannan Horton MD    ED Orders Placed :  Orders Placed This Encounter    SEVERE SEPSIS AND SEPTIC SHOCK BUNDLE INITIATED  CULTURE, BLOOD, PAIRED    XR CHEST PA LAT    CBC WITH AUTOMATED DIFF    METABOLIC PANEL, COMPREHENSIVE    NT-PRO BNP    MAGNESIUM    Hold Sample    METABOLIC PANEL, BASIC    CBC W/ AUTOMATED DIFF    DIET DIABETIC CONSISTENT CARB Regular    POC  LACTIC ACID    NEUROLOGIC STATUS ASSESSMENT - PER UNIT ROUTINE    NOTIFY PROVIDER: SPECIFY Notify provider within one hour to start vasopressors if patient is unable to maintain a MAP of greater than or equal to 65 mmHg despite fluid resuscitation CONTINUOUS STAT    NOTIFY PROVIDER: SPECIFY Notify provider on pt's arrival to floor ONE TIME STAT   [address] NOTIFY PROVIDER: SPECIFY Notify provider on pt's arrival to floor ONE TIME STAT    VITAL SIGNS PER UNIT ROUTINE    NOTIFY PROVIDER: VITAL SIGNS CHANGES    APPLY/MAINTAIN SEQUENTIAL COMPRESSION DEVICE    AMBULATE WITH ASSISTANCE    POC GLUCOSE    FULL CODE    OXIMETRY, SPOT CHECK    POC LACTIC ACID    GLUCOSE, POC    INSERT PERIPHERAL IV ONE TIME STAT    SALINE LOCK IV ONE TIME STAT    ampicillin-sulbactam (UNASYN) 3 g in 0.9% sodium chloride (MBP/ADV) 100 mL    sodium chloride 0.9 % bolus infusion 1,000 mL    metFORMIN (GLUMETZA ER) 500 mg TG24 24 hour tablet    nortriptyline (PAMELOR) 25 mg capsule    nortriptyline (PAMELOR) 25 mg capsule    lisinopril (PRINIVIL, ZESTRIL) 20 mg tablet    risperiDONE (RISPERDAL) 0.25 mg tablet    torsemide (DEMADEX) 5 mg tablet    cephALEXin (KEFLEX) 500 mg capsule    trimethoprim-sulfamethoxazole (BACTRIM DS) 160-800 mg per tablet    aspirin delayed-release tablet 81 mg    atorvastatin (LIPITOR) tablet 10 mg    calcium carbonate (OS-BETY) tablet 500 mg [elemental]    calcium polycarbophil (FIBERCON) tablet 625 mg    cyanocobalamin (VITAMIN B12) tablet 1,000 mcg    docusate sodium (COLACE) capsule 100 mg    lisinopril (PRINIVIL, ZESTRIL) tablet 40 mg    LORazepam (ATIVAN) tablet 0.25 mg    nortriptyline (PAMELOR) capsule 25 mg    nortriptyline (PAMELOR) capsule 75 mg    raloxifene (EVISTA) tablet 60 mg    risperiDONE (RisperDAL) tablet 0.25 mg    torsemide (DEMADEX) tablet 5 mg    sodium chloride (NS) flush 5-40 mL    sodium chloride (NS) flush 5-40 mL    acetaminophen (TYLENOL) tablet 650 mg    ondansetron (ZOFRAN) injection 4 mg    docusate sodium (COLACE) capsule 100 mg    DISCONTD: heparin (porcine) injection 5,000 Units    ampicillin-sulbactam (UNASYN) 3 g in 0.9% sodium chloride (MBP/ADV) 100 mL    insulin lispro (HUMALOG) injection    glucose chewable tablet 16 g    dextrose (D50) infusion 12.5-25 g    glucagon (GLUCAGEN) injection 1 mg    heparin (porcine) injection 5,000 Units    FOLLOWED BY Linked Order Group     vancomycin (VANCOCIN) 1,000 mg in 0.9% sodium chloride (MBP/ADV) 250 mL     vancomycin (VANCOCIN) 500 mg in 0.9% sodium chloride (MBP/ADV) 100 mL    IP CONSULT TO HOSPITALIST    INITIAL PHYSICIAN ORDER: INPATIENT Telemetry; 3.  Patient receiving treatment that can only be provided in an inpatient setting (further clarification in H&P documentation)    IP CONSULT TO CASE MANAGEMENT    Pharmacy to Dose Consult    IP CONSULT TO [facility] to Dose Consult    IP CONSULT TO [address] [place] [address]     ED Medications Administered:  Medications   aspirin delayed-release tablet 81 mg (has no administration in time range)   atorvastatin (LIPITOR) tablet 10 mg (has no administration in time range)   calcium carbonate (OS-BETY) tablet 500 mg [elemental] (has no administration in time range)   calcium polycarbophil (FIBERCON) tablet 625 mg (625 mg Oral Given 12/16/19 2207)   cyanocobalamin (VITAMIN B12) tablet 1,000 mcg (has no administration in time range)   docusate sodium (COLACE) capsule 100 mg (100 mg Oral Given 12/16/19 2014)   lisinopril (PRINIVIL, ZESTRIL) tablet 40 mg (has no administration in time range)   LORazepam (ATIVAN) tablet 0.25 mg (0.25 mg Oral Given 12/16/19 1829)   nortriptyline (PAMELOR) capsule 25 mg (25 mg Oral Given 12/16/19 2014)   nortriptyline (PAMELOR) capsule 75 mg (75 mg Oral Given 12/16/19 2208)   raloxifene (EVISTA) tablet 60 mg (has no administration in time range)   risperiDONE (RisperDAL) tablet 0.25 mg (has no administration in time range)   torsemide (DEMADEX) tablet 5 mg (5 mg Oral Given 12/16/19 2012)   sodium chloride (NS) flush 5-40 mL (10 mL IntraVENous Given 12/16/19 2208)   sodium chloride (NS) flush 5-40 mL (has no administration in time range)   acetaminophen (TYLENOL) tablet 650 mg (has no administration in time range)   ondansetron (ZOFRAN) injection 4 mg (has no administration in time range)   docusate sodium (COLACE) capsule 100 mg (has no administration in time range)   ampicillin-sulbactam (UNASYN) 3 g in 0.9% sodium chloride (MBP/ADV) 100 mL (3 g IntraVENous New Bag 12/16/19 2235)   insulin lispro (HUMALOG) injection (0 Units SubCUTAneous Held 12/16/19 2200)   glucose chewable tablet 16 g (has no administration in time range)   dextrose (D50) infusion 12.5-25 g (has no administration in time range)   glucagon (GLUCAGEN) injection 1 mg (has no administration in time range)   heparin (porcine) injection 5,000 Units (5,000 Units SubCUTAneous Given 12/16/19 2014)   vancomycin (VANCOCIN) 1,000 mg in 0.9% sodium chloride (MBP/ADV) 250 mL (1,000 mg IntraVENous New Bag 12/16/19 1831)     Followed by   vancomycin (VANCOCIN) 500 mg in 0.9% sodium chloride (MBP/ADV) 100 mL (has no administration in time range)   ampicillin-sulbactam (UNASYN) 3 g in 0.9% sodium chloride (MBP/ADV) 100 mL (3 g IntraVENous New Bag 12/16/19 1630)   sodium chloride 0.9 % bolus infusion 1,000 mL (1,000 mL IntraVENous New Bag 12/16/19 1536)         Progress Note:  Given failed outpatient treatment in setting of DM, will need admission    Progress Note:  Recent duplex negative for DVT, given extensive involvement of cellulitis, start broad IV abx, Vanc and Unasyn, will need wound care consult    Progress Note:  Intact pulses, will reassess need for DIANA's, vascular eval, agree with IV diuresis    Progress Note:  I have just re-evaluated the patient. Patient reports improvement of sx's. I have reviewed Her vital signs and determined there is currently no worsening in their condition or physical exam. Results have been reviewed with them and their questions have been answered. We will continue to review further results as they come available.   ______________________________________________________________________  Consult Note:  Isabel Suggs MD spoke with Dr. Angi Mathis,   Specialty: Hospitalist  Discussed pt's hx, disposition, and available diagnostic and imaging results. Reviewed care plans. Agree with management and plan thus far. Consultant will evaluate pt for admission. CRITICAL CARE NOTE :    IMPENDING DETERIORATION -Cardiovascular, Metabolic and Renal  ASSOCIATED RISK FACTORS - Hypotension, Shock, Hypoxia, Metabolic changes and Vascular Compromise  MANAGEMENT- Bedside Assessment and Supervision of Care  INTERPRETATION -  Xrays, ECG, Blood Pressure and Cardiac Output Measures   INTERVENTIONS - hemodynamic mngmt, vascular control and Metobolic interventions  CASE REVIEW - Hospitalist, Nursing and Family  TREATMENT RESPONSE -Improved  PERFORMED BY - Self    NOTES   :    I have spent 45 minutes of critical care time involved in lab review, consultations with specialist, family decision- making, bedside attention and documentation. During this entire length of time I was immediately available to the patient . Critical Care: The reason for providing this level of medical care for this critically ill patient was due to a critical illness that impaired one or more vital organ systems, such that there was a high probability of imminent or life threatening deterioration in the patient's condition.  This care involved high complexity decision making to assess, manipulate, and support vital system functions, to treat this degree of vital organ system failure, and to prevent further life threatening deterioration of the patients condition. Michela Page MD    Disposition: ADMIT  Patient is being admitted to the hospital. The results of their tests and reasons for their admission have been discussed with them and/or available family. I have discussed the case with the admitting specialist and expressed my high concern for decompensation if patient is discharged from the ED. The patient and/or available family convey agreement and understanding for the need to be admitted and for their admission diagnosis. Consultation has been made with the inpatient physician specialist for hospitalization. Diagnosis     Clinical Impression:   1. Bilateral lower leg cellulitis    2. Venous insufficiency    3. Uncontrolled type 2 diabetes mellitus with hyperglycemia ([address]    4. Bilateral leg edema    5. Ulcer of right foot, unspecified ulcer stage ([address]        Attestation:  I personally performed the services described in this documentation on this date 12/16/2019 for Bela Guajardo. I have reviewed and verified that all the information is accurate and complete. Michela Page MD      This note will not be viewable in [address]

## 2019-12-16 NOTE — PROGRESS NOTES
Pharmacy Clarification of Prior to Admission Medication Regimen     The patient was interviewed regarding clarification of the prior to admission medication regimen. Patient's daughter was present in room and obtained permission from patient to discuss drug regimen with visitor(s) present. Patient's daughter was questioned regarding use of any other inhalers, topical products, over the counter medications, herbal medications, vitamin products or ophthalmic/nasal/otic medication use. Information Obtained From: RX Query, Patient's daughter, Personal med list    Pertinent Pharmacy Findings:  Updated patients preferred outpatient pharmacy to: [facility]/pharmacy #[id number]- [address], [address]     [facility] medication list was corrected to the following:     Prior to Admission Medications   Prescriptions Last Dose Informant Taking? LORazepam (ATIVAN) 0.5 mg tablet 12/15/2019 at Unknown time Child Yes   Sig: Take 0.25 mg by mouth two (2) times a day. alpha-D-galactosidase (BEANO PO) 12/15/2019 at Unknown time Child Yes   Sig: Take 1 Tab by mouth daily. aspirin delayed-release 81 mg tablet 12/15/2019 at Unknown time Child Yes   Sig: Take 1 Tab by mouth every other day. atorvastatin (LIPITOR) 10 mg tablet 12/15/2019 at Unknown time Child Yes   Sig: Take 10 mg by mouth daily. calcium carbonate (CALCIUM 500 PO) 12/15/2019 at Unknown time Child Yes   Sig: Take 1 Tab by mouth daily. calcium polycarbophil (FIBERCON PO) 12/15/2019 at Unknown time Child Yes   Sig: Take 1 Tab by mouth two (2) times a day. cephALEXin (KEFLEX) 500 mg capsule 12/15/2019 at Unknown time Child Yes   Sig: Take 500 mg by mouth two (2) times a day. cyanocobalamin 1,000 mcg tablet 12/15/2019 at Unknown time Child Yes   Sig: Take 1,000 mcg by mouth daily. docusate sodium (COLACE) 100 mg capsule 12/15/2019 at Unknown time Child Yes   Sig: Take 100 mg by mouth two (2) times a day.    lisinopril (PRINIVIL, ZESTRIL) 20 mg tablet 12/15/2019 at Unknown time Child Yes   Sig: Take 40 mg by mouth daily. metFORMIN (GLUMETZA ER) 500 mg TG24 24 hour tablet 12/15/2019 at Unknown time Child Yes   Sig: Take 500 mg by mouth daily. nortriptyline (PAMELOR) 25 mg capsule 12/15/2019 at Unknown time Child Yes   Sig: Take 25 mg by mouth daily (with dinner). nortriptyline (PAMELOR) 25 mg capsule 12/15/2019 at Unknown time Child Yes   Sig: Take 75 mg by mouth nightly. raloxifene (EVISTA) 60 mg tablet 12/15/2019 at Unknown time Child Yes   Sig: Take 60 mg by mouth daily. risperiDONE (RISPERDAL) 0.25 mg tablet 12/15/2019 at Unknown time Child Yes   Sig: Take 0.25 mg by mouth every other day. torsemide (DEMADEX) 5 mg tablet 12/15/2019 at Unknown time Child Yes   Sig: Take 5 mg by mouth every evening. trimethoprim-sulfamethoxazole (BACTRIM DS) 160-800 mg per tablet 12/15/2019 at Unknown time Child Yes   Sig: Take 1 Tab by mouth two (2) times a day.       Facility-Administered Medications: None          Thank you,  Jesús Abreu  Medication History Pharmacy Technician

## 2019-12-16 NOTE — PROGRESS NOTES
Wt: 53kg    Per protocol, changed heparin to 5000 units every 12hr for body wt < 60kg.      MACKENZIE Das
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
## 2019-12-16 NOTE — ACP (ADVANCE CARE PLANNING)
Advance Care Planning Note      NAME: Lauren Garcia   :  1935   MRN:  [id number]     Date/Time:  2019 6:45 PM    Active Diagnoses:  Hospital Problems  Date Reviewed: 2018          Codes Class Noted POA    Bilateral lower leg cellulitis ICD-10-CM: L03.116, L03.115  ICD-9-CM: 682.6  2019 Unknown          DM type 2  HTN  Dyslipidemia  Depression     These active diagnoses are of sufficient risk that focused discussion on advance care planning is indicated in order to allow the patient to thoughtfully consider personal goals of care, and if situations arise that prevent the ability to personally give input, to ensure appropriate representation of their personal desires for different levels and aggressiveness of care. Discussion:   Code status addressed and wants to be a Full Code. Patient wants central line and vasopressors if needed. Patient  would like to assign Dtmaude Banegas as the surrogate decision maker. Persons present and participating in discussion: Melania De La Rosa MD, Dtr Emani Banegas. Time Spent:   Total time spent face-to-face in education and discussion: 16   minutes.          Trev Dunn MD   Hospitalist

## 2019-12-17 LAB
ANION GAP SERPL CALC-SCNC: 4 MMOL/L (ref 5–15)
BASOPHILS # BLD: 0 K/UL (ref 0–0.1)
BASOPHILS NFR BLD: 0 % (ref 0–1)
BUN SERPL-MCNC: 26 MG/DL (ref 6–20)
BUN/CREAT SERPL: 30 (ref 12–20)
CALCIUM SERPL-MCNC: 7.8 MG/DL (ref 8.5–10.1)
CHLORIDE SERPL-SCNC: 106 MMOL/L (ref 97–108)
CO2 SERPL-SCNC: 28 MMOL/L (ref 21–32)
CREAT SERPL-MCNC: 0.86 MG/DL (ref 0.55–1.02)
DIFFERENTIAL METHOD BLD: ABNORMAL
EOSINOPHIL # BLD: 1.5 K/UL (ref 0–0.4)
EOSINOPHIL NFR BLD: 16 % (ref 0–7)
ERYTHROCYTE [DISTWIDTH] IN BLOOD BY AUTOMATED COUNT: 13.1 % (ref 11.5–14.5)
GLUCOSE BLD STRIP.AUTO-MCNC: 106 MG/DL (ref 65–100)
GLUCOSE BLD STRIP.AUTO-MCNC: 160 MG/DL (ref 65–100)
GLUCOSE BLD STRIP.AUTO-MCNC: 216 MG/DL (ref 65–100)
GLUCOSE BLD STRIP.AUTO-MCNC: 98 MG/DL (ref 65–100)
GLUCOSE SERPL-MCNC: 103 MG/DL (ref 65–100)
HCT VFR BLD AUTO: 32.8 % (ref 35–47)
HGB BLD-MCNC: 10.6 G/DL (ref 11.5–16)
IMM GRANULOCYTES # BLD AUTO: 0 K/UL (ref 0–0.04)
IMM GRANULOCYTES NFR BLD AUTO: 0 % (ref 0–0.5)
LYMPHOCYTES # BLD: 1.1 K/UL (ref 0.8–3.5)
LYMPHOCYTES NFR BLD: 12 % (ref 12–49)
MCH RBC QN AUTO: 32.1 PG (ref 26–34)
MCHC RBC AUTO-ENTMCNC: 32.3 G/DL (ref 30–36.5)
MCV RBC AUTO: 99.4 FL (ref 80–99)
MONOCYTES # BLD: 0.6 K/UL (ref 0–1)
MONOCYTES NFR BLD: 6 % (ref 5–13)
NEUTS SEG # BLD: 6.3 K/UL (ref 1.8–8)
NEUTS SEG NFR BLD: 66 % (ref 32–75)
NRBC # BLD: 0 K/UL (ref 0–0.01)
NRBC BLD-RTO: 0 PER 100 WBC
PLATELET # BLD AUTO: 365 K/UL (ref 150–400)
PMV BLD AUTO: 9 FL (ref 8.9–12.9)
POTASSIUM SERPL-SCNC: 4.4 MMOL/L (ref 3.5–5.1)
RBC # BLD AUTO: 3.3 M/UL (ref 3.8–5.2)
RBC MORPH BLD: ABNORMAL
SERVICE CMNT-IMP: ABNORMAL
SERVICE CMNT-IMP: NORMAL
SODIUM SERPL-SCNC: 138 MMOL/L (ref 136–145)
WBC # BLD AUTO: 9.5 K/UL (ref 3.6–11)

## 2019-12-17 PROCEDURE — 74011000258 HC RX REV CODE- 258: Performed by: INTERNAL MEDICINE

## 2019-12-17 PROCEDURE — 85025 COMPLETE CBC W/AUTO DIFF WBC: CPT

## 2019-12-17 PROCEDURE — 74011250637 HC RX REV CODE- 250/637: Performed by: INTERNAL MEDICINE

## 2019-12-17 PROCEDURE — 36415 COLL VENOUS BLD VENIPUNCTURE: CPT

## 2019-12-17 PROCEDURE — 80048 BASIC METABOLIC PNL TOTAL CA: CPT

## 2019-12-17 PROCEDURE — 74011250636 HC RX REV CODE- 250/636: Performed by: INTERNAL MEDICINE

## 2019-12-17 PROCEDURE — 82962 GLUCOSE BLOOD TEST: CPT

## 2019-12-17 PROCEDURE — 74011636637 HC RX REV CODE- 636/637: Performed by: INTERNAL MEDICINE

## 2019-12-17 PROCEDURE — 94760 N-INVAS EAR/PLS OXIMETRY 1: CPT

## 2019-12-17 PROCEDURE — 65660000000 HC RM CCU STEPDOWN

## 2019-12-17 PROCEDURE — 77030038269 HC DRN EXT URIN PURWCK BARD -A

## 2019-12-17 RX ADMIN — CALCIUM 500 MG: 500 TABLET ORAL at 10:17

## 2019-12-17 RX ADMIN — ATORVASTATIN CALCIUM 10 MG: 10 TABLET, FILM COATED ORAL at 10:20

## 2019-12-17 RX ADMIN — Medication 10 ML: at 22:02

## 2019-12-17 RX ADMIN — RALOXIFENE HYDROCHLORIDE 60 MG: 60 TABLET, FILM COATED ORAL at 09:00

## 2019-12-17 RX ADMIN — LORAZEPAM 0.25 MG: 0.5 TABLET ORAL at 10:17

## 2019-12-17 RX ADMIN — CALCIUM POLYCARBOPHIL 625 MG: 625 TABLET, FILM COATED ORAL at 10:33

## 2019-12-17 RX ADMIN — INSULIN LISPRO 3 UNITS: 100 INJECTION, SOLUTION INTRAVENOUS; SUBCUTANEOUS at 11:30

## 2019-12-17 RX ADMIN — AMPICILLIN SODIUM AND SULBACTAM SODIUM 3 G: 2; 1 INJECTION, POWDER, FOR SOLUTION INTRAMUSCULAR; INTRAVENOUS at 22:00

## 2019-12-17 RX ADMIN — NORTRIPTYLINE HYDROCHLORIDE 75 MG: 25 CAPSULE ORAL at 22:00

## 2019-12-17 RX ADMIN — VANCOMYCIN HYDROCHLORIDE 500 MG: 500 INJECTION, POWDER, LYOPHILIZED, FOR SOLUTION INTRAVENOUS at 13:10

## 2019-12-17 RX ADMIN — LORAZEPAM 0.25 MG: 0.5 TABLET ORAL at 18:57

## 2019-12-17 RX ADMIN — HEPARIN SODIUM 5000 UNITS: 5000 INJECTION INTRAVENOUS; SUBCUTANEOUS at 22:00

## 2019-12-17 RX ADMIN — AMPICILLIN SODIUM AND SULBACTAM SODIUM 3 G: 2; 1 INJECTION, POWDER, FOR SOLUTION INTRAMUSCULAR; INTRAVENOUS at 06:42

## 2019-12-17 RX ADMIN — Medication 10 ML: at 14:43

## 2019-12-17 RX ADMIN — AMPICILLIN SODIUM AND SULBACTAM SODIUM 3 G: 2; 1 INJECTION, POWDER, FOR SOLUTION INTRAMUSCULAR; INTRAVENOUS at 14:43

## 2019-12-17 RX ADMIN — HEPARIN SODIUM 5000 UNITS: 5000 INJECTION INTRAVENOUS; SUBCUTANEOUS at 10:20

## 2019-12-17 RX ADMIN — Medication 10 ML: at 05:53

## 2019-12-17 RX ADMIN — RISPERIDONE 0.25 MG: 0.25 TABLET ORAL at 22:00

## 2019-12-17 RX ADMIN — ASPIRIN 81 MG: 81 TABLET, COATED ORAL at 10:17

## 2019-12-17 RX ADMIN — DOCUSATE SODIUM 100 MG: 100 CAPSULE, LIQUID FILLED ORAL at 18:58

## 2019-12-17 RX ADMIN — CYANOCOBALAMIN TAB 500 MCG 1000 MCG: 500 TAB at 10:17

## 2019-12-17 RX ADMIN — CALCIUM POLYCARBOPHIL 625 MG: 625 TABLET, FILM COATED ORAL at 22:00

## 2019-12-17 RX ADMIN — TORSEMIDE 5 MG: 20 TABLET ORAL at 18:59

## 2019-12-17 RX ADMIN — LISINOPRIL 40 MG: 40 TABLET ORAL at 10:37

## 2019-12-17 RX ADMIN — DOCUSATE SODIUM 100 MG: 100 CAPSULE, LIQUID FILLED ORAL at 10:18

## 2019-12-17 RX ADMIN — NORTRIPTYLINE HYDROCHLORIDE 25 MG: 25 CAPSULE ORAL at 18:58

## 2019-12-17 NOTE — PROGRESS NOTES
Hospitalist Progress Note    NAME: Candace Abbott   :  1935   MRN:  [id number]       Assessment / Plan:  Bilateral lower extremity cellulitis POA- with small right leg ulcer with failed outpatient treatment   in the setting of Diabetes mellitus POA  h/o chronic foot drop with prosthesis POA that is malfunctioning  -Recent ultrasound shows no evidence of DVT  -No criteria for sepsis currently  -There is extensive involvement of skin up to the level of the knees  Blood Cx neg x 14 hrs    Cont empiric IV Abx- IV vancomycin and Unasyn. Consult wound care- consulted inpatient  Leg elevation  She will need a different prosthesis since that is contributing to her wounds per patient's daughter     Diabetes mellitus type 2 POA- controlled  Hypertension  Dyslipidemia  Depression  Edema of both legs  -Holding home metformin. Cont SSI lispro here  Cont home lisinopril  Cont home statin  Cont home torsemide(Diuretic) to help with swelling of legs           Code Status full code  Surrogate Decision Maker: Daughter Angelina Ivy     DVT Prophylaxis: Heparin        Baseline: From home, independent, from [place]    Recommended Disposition: Home w/Family and HH PT, OT, RN? TBD     Subjective:     Chief Complaint / Reason for Physician Visit :F/U B/L LE cellulitis, DM  \"I am fine\". Discussed with RN events overnight. Review of Systems:  Symptom Y/N Comments  Symptom Y/N Comments   Fever/Chills n   Chest Pain n    Poor Appetite n   Edema n    Cough n   Abdominal Pain n    Sputum n   Joint Pain     SOB/BERNAL n   Pruritis/Rash y B/L LE redness   Nausea/vomit    Tolerating PT/OT y    Diarrhea    Tolerating Diet y    Constipation    Other       Could NOT obtain due to:      Objective:     VITALS:   Last 24hrs VS reviewed since prior progress note.  Most recent are:  Patient Vitals for the past 24 hrs:   Temp Pulse Resp BP SpO2   19 0712 97.9 °F (36.6 °C) 73 17 127/47 100 %   19 0250 97.8 °F (36.6 °C) 71 18 129/51 100 %   12/16/19 2230 98.4 °F (36.9 °C) 67 18 122/55 100 %   12/16/19 1945 97.8 °F (36.6 °C) 66 18 106/57 99 %   12/16/19 1800 96.7 °F (35.9 °C)   131/52    12/16/19 1600  60  162/42    12/16/19 1326 97.8 °F (36.6 °C) 73 18 (!) 132/92 95 %       Intake/Output Summary (Last 24 hours) at 12/17/2019 0902  Last data filed at 12/17/2019 0601  Gross per 24 hour   Intake 100 ml   Output 1800 ml   Net -1700 ml        PHYSICAL EXAM:  General: WD, WN. Alert, cooperative, no acute distress    EENT:  EOMI. Anicteric sclerae. MMM  Resp:  CTA bilaterally, no wheezing or rales. No accessory muscle use  CV:  Regular  rhythm,  No edema  GI:  Soft, Non distended, Non tender.  +Bowel sounds  Neurologic:  Alert and oriented X 3, normal speech,   Psych:   Good insight. Not anxious nor agitated  Skin:  No rashes. No jaundice, B/l LE redness +, warmth +, swelling + noted, Ulcer on the R leg (anterior) part    Reviewed most current lab test results and cultures  YES  Reviewed most current radiology test results   YES  Review and summation of old records today    NO  Reviewed patient's current orders and MAR    YES  PMH/ reviewed - no change compared to H&P  ________________________________________________________________________  Care Plan discussed with:    Comments   Patient x    Family      RN x    Care Manager     Consultant                        Multidiciplinary team rounds were held today with , nursing, pharmacist and clinical coordinator. Patient's plan of care was discussed; medications were reviewed and discharge planning was addressed.      ________________________________________________________________________  Total NON critical care TIME:  36   Minutes    Total CRITICAL CARE TIME Spent:   Minutes non procedure based      Comments   >50% of visit spent in counseling and coordination of care     ________________________________________________________________________  Artemio Higgins MD Procedures: see electronic medical records for all procedures/Xrays and details which were not copied into this note but were reviewed prior to creation of Plan. LABS:  I reviewed today's most current labs and imaging studies.   Pertinent labs include:  Recent Labs     12/17/19  0249 12/16/19  1514   WBC 9.5 10.8   HGB 10.6* 11.2*   HCT 32.8* 35.1    393     Recent Labs     12/17/19  0249 12/16/19  1514    134*   K 4.4 4.5    102   CO2 28 31   * 102*   BUN 26* 26*   CREA 0.86 0.96   CA 7.8* 8.7   MG  --  2.2   ALB  --  3.0*   TBILI  --  0.3   SGOT  --  34   ALT  --  32       Signed: Britney Penaloza MD

## 2019-12-17 NOTE — PROGRESS NOTES
Primary Nurse Ariel Urena RN and Opal Hernandez RN performed a dual skin assessment on this patient. No pressure ulcers noted. Both lower extremities noted with multiple dry scabs. BLE red and with dry and flaky skins. Patient admitted with cellulitis to both lower extremities and she has existing psoriasis to said extremities.   Sumeet score is 19

## 2019-12-17 NOTE — PROGRESS NOTES
Physical Therapy Screening:    [facility] based on results obtained during the nursing admission assessment. The patients chart was reviewed and the patient is appropriate for a skilled therapy evaluation if there is a decline in functional mobility from baseline. Please order a consult for physical therapy if you are in agreement and would like an evaluation to be completed. Thank you.     Douglas Galan, PT, DPT

## 2019-12-17 NOTE — PROGRESS NOTES
Bedside and Verbal shift change report given to [id number] [facility]  (oncoming nurse) . Report included the following information SBAR, Kardex, ED Summary, Intake/Output, MAR and Recent Results.

## 2019-12-17 NOTE — PROGRESS NOTES
Reason for Admission:   Bilateral lower leg cellulitis                   RRAT Score:  5                   Plan for utilizing home health:   TBD                       Current Advanced Directive/Advance Care Plan:                          Transition of Care Plan:    Prior to admission, pt was independent with ADL/IADL. Patient does not drive, but has very supportive daughter to assist with transportation. Patient's daughter states that she visits pt in [place], [place], at least once a week and that pt travels to [place] often though out the year. Patient denies past HH/Rehab. Patient admits to DME use(cane,leg supports). Patients support system includes, daughter and daughter's fiance, who lives in [facility].  Per pt's daughter, if MD is in agreement, pt would like to go back to [place] [facility] at d/c. CM will continue to follow. PCP- Dr Dick Burn  Pharmacy-[facility] on [address] Management Interventions  PCP Verified by CM: Yes(Dr Shelton)  Mode of Transport at Discharge: Other (see comment)(daughter)  Transition of Care Consult (CM Consult): Discharge Planning  Discharge Durable Medical Equipment: No(cane, leg supports,)  Physical Therapy Consult: Yes  Occupational Therapy Consult: Yes  Speech Therapy Consult: No  Current Support Network: Lives Alone(Lives in a one story home with 14 [address]AC[address])  Confirm Follow Up Transport: Family(daughter)  Plan discussed with Pt/Family/Caregiver: Yes(daughter at bedside)  Discharge Location  Discharge Placement: (Anticipate home with daughter)      Peggi Perches  Ext [phone]    [facility] Plan:     *If MD is in agreement, home to [place] [facility] at d/c   *daughter to transport pt at d/c      .

## 2019-12-17 NOTE — PROGRESS NOTES
Initial Nutrition Assessment:    INTERVENTIONS/RECOMMENDATIONS:   · Continue consistent carb diet  · Consider getting new HbA1c  · Glucerna daily  · Please document % meals and supplements consumed in flowsheet I/O's under intake     ASSESSMENT:   Chart reviewed, medically noted for Bilateral lower extremity cellulitis, T2DM, HTN and PMH shown below. Nutrition referral triggered due to MST score however false trigger due to scoring shown below and pt denies significant weight loss PTA. Her usual eating habits consist of breakfast and dinner meals with 2 snacks consisting of fruit in between meals. She is thin in appearance with protruding clavicles and mild temporal wasting. She reports she watches her CHO intake. Recently Lost Weight Without Trying Unsure filed at 12/17/2019 0028   If Yes, How Much Weight Loss    Eating Poorly Due to Decreased Appetite No filed at 12/17/2019 0028   MST Score 2 filed at 12/17/2019 0028     Past Medical History:   Diagnosis Date    Depression     Diabetes ([facility] [address]     Hypercholesteremia        Diet Order: Consistent carb  % Eaten:  No data found. Pertinent Medications: [x]Reviewed: os-rico, vit B12, colace, humalog,   Pertinent Labs: [x]Reviewed:   Food Allergies: [x]NKFA  []Other   Last BM: 12/16  Edema:        []RUE   []LUE   [x]RLE 2+  [x]LLE 2+     Pressure Injury:      [] Stage I   [] Stage II   [] Stage III   [] Stage IV      Wt Readings from Last 30 Encounters:   12/16/19 53 kg (116 lb 13.5 oz)   05/07/18 55.3 kg (122 lb)       Anthropometrics:   Height: 5' 6.93\" (170 cm) Weight: 53 kg (116 lb 13.5 oz)   IBW (%IBW):   ( ) UBW (%UBW):   (  %)   Last Weight Metrics:  Weight Loss Metrics 12/16/2019 5/7/2018   Today's Wt 116 lb 13.5 oz 122 lb   BMI 18.34 kg/m2 19.11 kg/m2       BMI: Body mass index is 18.34 kg/m².     This BMI is indicative of:   [x]Underweight    []Normal    []Overweight    [] Obesity   [] Extreme Obesity (BMI>40)     Estimated Nutrition Needs (Based on): 1325 Kcals/day(BMR: 1025 x 1.3) , 65 g(1.2 g/kg) Protein  Carbohydrate: At Least 130 g/day  Fluids: 1325 mL/day (1ml/kcal) or per primary team    NUTRITION DIAGNOSES:   Problem:  No nutritional diagnosis at this time      Etiology: related to       Signs/Symptoms: as evidenced by        NUTRITION INTERVENTIONS:  Meals/Snacks: General/healthful diet                  GOAL:   consume >50% of meals in 3-5 days    LEARNING NEEDS (Diet, Food/Nutrient-Drug Interaction):    [x] None Identified   [] Identified and Education Provided/Documented   [] Identified and Pt declined/was not appropriate     Cultureal, Confucianism, OR Ethnic Dietary Needs:    [x] None Identified   [] Identified and Addressed     [x] Interdisciplinary Care Plan Reviewed/Documented    [x] Discharge Planning: Consistent carb diet with adequate protein       MONITORING /EVALUATION:      Food/Nutrient Intake Outcomes:  Total energy intake  Physical Signs/Symptoms Outcomes: Weight/weight change, Electrolyte and renal profile, Glucose profile, GI    NUTRITION RISK:    [] High              [x] Moderate           []  Low  []  Minimal/Uncompromised    PT SEEN FOR:    []  MD Consult: []Calorie Count      []Diabetic Diet Education        []Diet Education     []Electrolyte Management     []General Nutrition Management and Supplements     []Management of Tube Feeding     []TPN Recommendations    [x]  RN Referral:  [x]MST score >=2     []Enteral/Parenteral Nutrition PTA     []Pregnant: Gestational DM or Multigestation     []Pressure Ulcer/Wound Care needs        []  Low BMI  []  LOS Referral       Brigette Kramer RDN  Pager [phone]  Weekend Pager [phone]

## 2019-12-17 NOTE — PROGRESS NOTES
Spiritual Care Partner Volunteer visited patient in [place] unit on 12/17/19. Documented by:  Rev. Carmel Rao EdD MDiv     For  Assistance Page [phone])

## 2019-12-17 NOTE — PHYSICIAN ADVISORY
.This patient is at high risk of adverse events and deterioration based on documented clinical data, comorbid conditions and current acute care course. Ms. Deuce Grimes is expected to meet Inpatient Admission status criteria in accordance with CMS regulation Section 43 .3. Specifically, due to medical necessity the patient's stay is expected to exceed Two Midnights. It is our recommendation that this patient's hospitalization status should be  INPATIENT status. The final decision of the patient's hospitalization status depends on the attending physician's judgment.

## 2019-12-18 ENCOUNTER — APPOINTMENT (OUTPATIENT)
Dept: ULTRASOUND IMAGING | Age: 84
DRG: 638 | End: 2019-12-18
Attending: INTERNAL MEDICINE
Payer: MEDICARE

## 2019-12-18 LAB
ANION GAP SERPL CALC-SCNC: 5 MMOL/L (ref 5–15)
BUN SERPL-MCNC: 21 MG/DL (ref 6–20)
BUN/CREAT SERPL: 24 (ref 12–20)
CALCIUM SERPL-MCNC: 8.5 MG/DL (ref 8.5–10.1)
CHLORIDE SERPL-SCNC: 103 MMOL/L (ref 97–108)
CO2 SERPL-SCNC: 28 MMOL/L (ref 21–32)
CREAT SERPL-MCNC: 0.88 MG/DL (ref 0.55–1.02)
GLUCOSE BLD STRIP.AUTO-MCNC: 121 MG/DL (ref 65–100)
GLUCOSE BLD STRIP.AUTO-MCNC: 142 MG/DL (ref 65–100)
GLUCOSE BLD STRIP.AUTO-MCNC: 335 MG/DL (ref 65–100)
GLUCOSE BLD STRIP.AUTO-MCNC: 73 MG/DL (ref 65–100)
GLUCOSE SERPL-MCNC: 110 MG/DL (ref 65–100)
POTASSIUM SERPL-SCNC: 4 MMOL/L (ref 3.5–5.1)
SERVICE CMNT-IMP: ABNORMAL
SERVICE CMNT-IMP: NORMAL
SODIUM SERPL-SCNC: 136 MMOL/L (ref 136–145)

## 2019-12-18 PROCEDURE — 94760 N-INVAS EAR/PLS OXIMETRY 1: CPT

## 2019-12-18 PROCEDURE — 74011250637 HC RX REV CODE- 250/637: Performed by: INTERNAL MEDICINE

## 2019-12-18 PROCEDURE — 36415 COLL VENOUS BLD VENIPUNCTURE: CPT

## 2019-12-18 PROCEDURE — 74011000258 HC RX REV CODE- 258: Performed by: INTERNAL MEDICINE

## 2019-12-18 PROCEDURE — 74011250636 HC RX REV CODE- 250/636: Performed by: INTERNAL MEDICINE

## 2019-12-18 PROCEDURE — 76700 US EXAM ABDOM COMPLETE: CPT

## 2019-12-18 PROCEDURE — 74011636637 HC RX REV CODE- 636/637: Performed by: INTERNAL MEDICINE

## 2019-12-18 PROCEDURE — 80048 BASIC METABOLIC PNL TOTAL CA: CPT

## 2019-12-18 PROCEDURE — 65660000000 HC RM CCU STEPDOWN

## 2019-12-18 PROCEDURE — 82962 GLUCOSE BLOOD TEST: CPT

## 2019-12-18 RX ORDER — DEXTROSE MONOHYDRATE 100 MG/ML
0-250 INJECTION, SOLUTION INTRAVENOUS AS NEEDED
Status: DISCONTINUED | OUTPATIENT
Start: 2019-12-18 | End: 2019-12-18

## 2019-12-18 RX ORDER — INSULIN LISPRO 100 [IU]/ML
INJECTION, SOLUTION INTRAVENOUS; SUBCUTANEOUS
Status: DISCONTINUED | OUTPATIENT
Start: 2019-12-18 | End: 2019-12-24 | Stop reason: HOSPADM

## 2019-12-18 RX ORDER — POLYETHYLENE GLYCOL 3350 17 G/17G
17 POWDER, FOR SOLUTION ORAL DAILY
Status: DISCONTINUED | OUTPATIENT
Start: 2019-12-18 | End: 2019-12-24 | Stop reason: HOSPADM

## 2019-12-18 RX ADMIN — AMPICILLIN SODIUM AND SULBACTAM SODIUM 3 G: 2; 1 INJECTION, POWDER, FOR SOLUTION INTRAMUSCULAR; INTRAVENOUS at 06:18

## 2019-12-18 RX ADMIN — HEPARIN SODIUM 5000 UNITS: 5000 INJECTION INTRAVENOUS; SUBCUTANEOUS at 21:38

## 2019-12-18 RX ADMIN — INSULIN LISPRO 7 UNITS: 100 INJECTION, SOLUTION INTRAVENOUS; SUBCUTANEOUS at 11:28

## 2019-12-18 RX ADMIN — CALCIUM 500 MG: 500 TABLET ORAL at 09:28

## 2019-12-18 RX ADMIN — CALCIUM POLYCARBOPHIL 625 MG: 625 TABLET, FILM COATED ORAL at 21:35

## 2019-12-18 RX ADMIN — NORTRIPTYLINE HYDROCHLORIDE 75 MG: 25 CAPSULE ORAL at 21:39

## 2019-12-18 RX ADMIN — CALCIUM POLYCARBOPHIL 625 MG: 625 TABLET, FILM COATED ORAL at 10:10

## 2019-12-18 RX ADMIN — Medication 10 ML: at 06:18

## 2019-12-18 RX ADMIN — NORTRIPTYLINE HYDROCHLORIDE 25 MG: 25 CAPSULE ORAL at 18:35

## 2019-12-18 RX ADMIN — AMPICILLIN SODIUM AND SULBACTAM SODIUM 3 G: 2; 1 INJECTION, POWDER, FOR SOLUTION INTRAMUSCULAR; INTRAVENOUS at 23:40

## 2019-12-18 RX ADMIN — LORAZEPAM 0.25 MG: 0.5 TABLET ORAL at 09:29

## 2019-12-18 RX ADMIN — VANCOMYCIN HYDROCHLORIDE 500 MG: 500 INJECTION, POWDER, LYOPHILIZED, FOR SOLUTION INTRAVENOUS at 02:49

## 2019-12-18 RX ADMIN — VANCOMYCIN HYDROCHLORIDE 500 MG: 500 INJECTION, POWDER, LYOPHILIZED, FOR SOLUTION INTRAVENOUS at 18:59

## 2019-12-18 RX ADMIN — TORSEMIDE 5 MG: 20 TABLET ORAL at 17:10

## 2019-12-18 RX ADMIN — Medication 20 ML: at 13:13

## 2019-12-18 RX ADMIN — DOCUSATE SODIUM 100 MG: 100 CAPSULE, LIQUID FILLED ORAL at 09:28

## 2019-12-18 RX ADMIN — DOCUSATE SODIUM 100 MG: 100 CAPSULE, LIQUID FILLED ORAL at 17:10

## 2019-12-18 RX ADMIN — AMPICILLIN SODIUM AND SULBACTAM SODIUM 3 G: 2; 1 INJECTION, POWDER, FOR SOLUTION INTRAMUSCULAR; INTRAVENOUS at 17:05

## 2019-12-18 RX ADMIN — ATORVASTATIN CALCIUM 10 MG: 10 TABLET, FILM COATED ORAL at 09:29

## 2019-12-18 RX ADMIN — LORAZEPAM 0.25 MG: 0.5 TABLET ORAL at 17:12

## 2019-12-18 RX ADMIN — RALOXIFENE HYDROCHLORIDE 60 MG: 60 TABLET, FILM COATED ORAL at 10:11

## 2019-12-18 RX ADMIN — Medication 10 ML: at 22:36

## 2019-12-18 RX ADMIN — HEPARIN SODIUM 5000 UNITS: 5000 INJECTION INTRAVENOUS; SUBCUTANEOUS at 09:30

## 2019-12-18 RX ADMIN — POLYETHYLENE GLYCOL (3350) 17 G: 17 POWDER, FOR SOLUTION ORAL at 09:27

## 2019-12-18 RX ADMIN — LISINOPRIL 40 MG: 40 TABLET ORAL at 09:28

## 2019-12-18 RX ADMIN — CYANOCOBALAMIN TAB 500 MCG 1000 MCG: 500 TAB at 09:28

## 2019-12-18 NOTE — PROGRESS NOTES
General Surgery End of Shift Nursing Note    Bedside shift change report given to [facility] (oncoming nurse) by Abner Loyd (offgoing nurse). Report included the following information SBAR, Kardex, ED Summary and MAR. Shift worked:   7p-7a   Summary of shift:    Patient had a quiet night. Patient did not complain of pain, except when she would get up to walk to bathroom, which she did x 2. Patient's VS WNL during shift. na     Number times ambulated in hallway past shift: 0    Number of times OOB to chair past shift: 1    Pain Management:  Current medication: na  Patient states pain is manageable on current pain medication: YES    GI:    Current diet:  DIET DIABETIC CONSISTENT CARB Regular  DIET NUTRITIONAL SUPPLEMENTS No; Lunch; Glucerna Shake  DIET ONE TIME MESSAGE    Tolerating current diet: YES  Passing flatus: YES  Last Bowel Movement: yesterday   Appearance: unknown    Respiratory:    Incentive Spirometer at bedside: NO  Patient instructed on use: NO    Patient Safety:    Falls Score: 3  Bed Alarm On? No  Sitter?  No    [facility]

## 2019-12-18 NOTE — WOUND CARE
Wound care consult for the legs: Reviewed the chart. Wound care unable to see patient today but will see tomorrow. Preliminary wound care orders placed based on current documentation and the photos that were taken in the ED. See photo below:  
 
 
Cleanse the legs with CHG soap (white bottle of soap in the supply room) and wipe well to exfoliate with washcloths. Rinse with water and then apply the moisturizer to the skin. For the open wounds - apply xeroform gauze and then 4x4's and wrap with César.   
Jose Maria Dumont RN, BSN, [employer]

## 2019-12-18 NOTE — PROGRESS NOTES
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 Based on documentation and patient's daughter's impression, it appears that this is improving.   -Blood Cx remain negative at 2 days.  -Currently, will continue empiric IV vancomycin and Unasyn. Will obtain MRSA swab to see whether we can transition vancomycin to an alternate agent (if MRSA negative). -Consult wound care - consulted inpatient - await input  -Leg elevation  -Recent ultrasound shows no evidence of DVT  -She will need a different orthotic device since that is contributing to her wounds per patient's daughter. Appears that this will have to happen as an outpatient.     Diabetes mellitus type 2 POA- controlled  -Significant fluctuations here -- will reduce corrective insulin regimen to insulin-sensitive   -Check HgbA1c in AM  -Holding home metformin    Abdominal distention/abnormal exam  -Obtained abdominal US to further assess -- no acute findings  -Will reevaluate in AM    Hypertension  Dyslipidemia  Depression  Edema of both legs  Cont home lisinopril  Cont home statin  Cont home torsemide and repeat BMP in AM.      Code Status full code  Surrogate Decision Maker: Daughter Cresencio Hidalgo     DVT Prophylaxis: Heparin        Baseline: From home, independent, from [place]    Recommended Disposition: Home w/Family and HH PT, OT, RN? TBD     Subjective:     Chief Complaint / Reason for Physician Visit :F/U B/L LE cellulitis, DM  Per daughter, patient's legs are much improved, reduced edema and redness. Continued flaking skin noted. No nausea, vomiting, diarrhea.     Review of Systems:  Symptom Y/N Comments  Symptom Y/N Comments   Fever/Chills n   Chest Pain n    Poor Appetite n   Edema n    Cough n   Abdominal Pain n    Sputum n   Joint Pain     SOB/BERNAL n   Pruritis/Rash y B/L LE redness and flaking   Nausea/vomit    Tolerating PT/OT y    Diarrhea    Tolerating Diet y    Constipation    Other       Could NOT obtain due to:      Objective:     VITALS:   Last 24hrs VS reviewed since prior progress note. Most recent are:  Patient Vitals for the past 24 hrs:   Temp Pulse Resp BP SpO2   12/18/19 0843 97 °F (36.1 °C) 73 14 157/55 100 %   12/18/19 0257 97.2 °F (36.2 °C) 65 18 147/48 100 %   12/17/19 2305 98.2 °F (36.8 °C) 79 18 148/57 100 %   12/17/19 2038 97.9 °F (36.6 °C) 68 18 145/57 97 %   12/17/19 1525 98.4 °F (36.9 °C) 75 17 140/51 100 %   12/17/19 1116 97.7 °F (36.5 °C) 73 17 133/52 100 %       Intake/Output Summary (Last 24 hours) at 12/18/2019 0908  Last data filed at 12/18/2019 0249  Gross per 24 hour   Intake    Output 2100 ml   Net -2100 ml        PHYSICAL EXAM:  General: WD, WN. Alert, cooperative, no acute distress    EENT:  EOMI. Anicteric sclerae. MMM  Resp:  CTA bilaterally, no wheezing or rales. No accessory muscle use  CV:  Regular  rhythm,  No edema  GI:  Soft, distended, Non tender.  +Bowel sounds  Neurologic:  Alert and oriented X 3, normal speech, poor short term memory  Psych:   Limited insight and judgment. Not anxious nor agitated  Skin:  No rashes.   No jaundice, minimal bilateral lower extremity redness and warmth, trace edema only, persistent RLE ulcer noted    Reviewed most current lab test results and cultures  YES  Reviewed most current radiology test results   YES  Review and summation of old records today    NO  Reviewed patient's current orders and MAR    YES  PMH/SH reviewed - no change compared to H&P  ________________________________________________________________________  Care Plan discussed with:    Comments   Patient x    Family      RN x    Care Manager     Consultant Multidiciplinary team rounds were held today with , nursing, pharmacist and clinical coordinator. Patient's plan of care was discussed; medications were reviewed and discharge planning was addressed. ________________________________________________________________________  Total NON critical care TIME:  36   Minutes    Total CRITICAL CARE TIME Spent:   Minutes non procedure based      Comments   >50% of visit spent in counseling and coordination of care     ________________________________________________________________________  Darin Capps MD     Procedures: see electronic medical records for all procedures/Xrays and details which were not copied into this note but were reviewed prior to creation of Plan. LABS:  I reviewed today's most current labs and imaging studies.   Pertinent labs include:  Recent Labs     12/17/19  0249 12/16/19  1514   WBC 9.5 10.8   HGB 10.6* 11.2*   HCT 32.8* 35.1    393     Recent Labs     12/18/19  0252 12/17/19  0249 12/16/19  1514    138 134*   K 4.0 4.4 4.5    106 102   CO2 28 28 31   * 103* 102*   BUN 21* 26* 26*   CREA 0.88 0.86 0.96   CA 8.5 7.8* 8.7   MG  --   --  2.2   ALB  --   --  3.0*   TBILI  --   --  0.3   SGOT  --   --  34   ALT  --   --  32       Signed: Darin Capps MD

## 2019-12-19 ENCOUNTER — APPOINTMENT (OUTPATIENT)
Dept: VASCULAR SURGERY | Age: 84
DRG: 638 | End: 2019-12-19
Attending: INTERNAL MEDICINE
Payer: MEDICARE

## 2019-12-19 LAB
ANION GAP SERPL CALC-SCNC: 6 MMOL/L (ref 5–15)
BUN SERPL-MCNC: 22 MG/DL (ref 6–20)
BUN/CREAT SERPL: 26 (ref 12–20)
CALCIUM SERPL-MCNC: 8.3 MG/DL (ref 8.5–10.1)
CHLORIDE SERPL-SCNC: 102 MMOL/L (ref 97–108)
CO2 SERPL-SCNC: 31 MMOL/L (ref 21–32)
CREAT SERPL-MCNC: 0.84 MG/DL (ref 0.55–1.02)
DATE LAST DOSE: NORMAL
EST. AVERAGE GLUCOSE BLD GHB EST-MCNC: 114 MG/DL
GLUCOSE BLD STRIP.AUTO-MCNC: 108 MG/DL (ref 65–100)
GLUCOSE BLD STRIP.AUTO-MCNC: 124 MG/DL (ref 65–100)
GLUCOSE BLD STRIP.AUTO-MCNC: 138 MG/DL (ref 65–100)
GLUCOSE BLD STRIP.AUTO-MCNC: 160 MG/DL (ref 65–100)
GLUCOSE SERPL-MCNC: 118 MG/DL (ref 65–100)
HBA1C MFR BLD: 5.6 % (ref 4–5.6)
LEFT ABI: 1.16
LEFT ANTERIOR TIBIAL: 145 MMHG
LEFT ARM BP: 137 MMHG
LEFT POSTERIOR TIBIAL: 162 MMHG
LEFT TBI: 0.4
LEFT TOE PRESSURE: 56 MMHG
POTASSIUM SERPL-SCNC: 3.7 MMOL/L (ref 3.5–5.1)
REPORTED DOSE,DOSE: NORMAL UNITS
REPORTED DOSE/TIME,TMG: 1900
RIGHT ABI: 1.06
RIGHT ANTERIOR TIBIAL: 149 MMHG
RIGHT ARM BP: 140 MMHG
RIGHT POSTERIOR TIBIAL: 141 MMHG
RIGHT TBI: 0.4
RIGHT TOE PRESSURE: 56 MMHG
SERVICE CMNT-IMP: ABNORMAL
SODIUM SERPL-SCNC: 139 MMOL/L (ref 136–145)
VANCOMYCIN TROUGH SERPL-MCNC: 7.8 UG/ML (ref 5–10)

## 2019-12-19 PROCEDURE — 36415 COLL VENOUS BLD VENIPUNCTURE: CPT

## 2019-12-19 PROCEDURE — 65660000000 HC RM CCU STEPDOWN

## 2019-12-19 PROCEDURE — 94760 N-INVAS EAR/PLS OXIMETRY 1: CPT

## 2019-12-19 PROCEDURE — 74011250636 HC RX REV CODE- 250/636: Performed by: INTERNAL MEDICINE

## 2019-12-19 PROCEDURE — 74011250637 HC RX REV CODE- 250/637: Performed by: INTERNAL MEDICINE

## 2019-12-19 PROCEDURE — 74011000258 HC RX REV CODE- 258: Performed by: INTERNAL MEDICINE

## 2019-12-19 PROCEDURE — 93922 UPR/L XTREMITY ART 2 LEVELS: CPT

## 2019-12-19 PROCEDURE — 82962 GLUCOSE BLOOD TEST: CPT

## 2019-12-19 PROCEDURE — 80048 BASIC METABOLIC PNL TOTAL CA: CPT

## 2019-12-19 PROCEDURE — 83036 HEMOGLOBIN GLYCOSYLATED A1C: CPT

## 2019-12-19 PROCEDURE — 77030038269 HC DRN EXT URIN PURWCK BARD -A

## 2019-12-19 PROCEDURE — 80202 ASSAY OF VANCOMYCIN: CPT

## 2019-12-19 RX ORDER — ADHESIVE BANDAGE
30 BANDAGE TOPICAL DAILY PRN
Status: DISCONTINUED | OUTPATIENT
Start: 2019-12-19 | End: 2019-12-24 | Stop reason: HOSPADM

## 2019-12-19 RX ADMIN — LISINOPRIL 40 MG: 40 TABLET ORAL at 08:39

## 2019-12-19 RX ADMIN — LORAZEPAM 0.25 MG: 0.5 TABLET ORAL at 17:21

## 2019-12-19 RX ADMIN — ASPIRIN 81 MG: 81 TABLET, COATED ORAL at 08:39

## 2019-12-19 RX ADMIN — Medication 10 ML: at 22:34

## 2019-12-19 RX ADMIN — HEPARIN SODIUM 5000 UNITS: 5000 INJECTION INTRAVENOUS; SUBCUTANEOUS at 08:39

## 2019-12-19 RX ADMIN — HEPARIN SODIUM 5000 UNITS: 5000 INJECTION INTRAVENOUS; SUBCUTANEOUS at 20:49

## 2019-12-19 RX ADMIN — RALOXIFENE HYDROCHLORIDE 60 MG: 60 TABLET, FILM COATED ORAL at 08:51

## 2019-12-19 RX ADMIN — TORSEMIDE 5 MG: 20 TABLET ORAL at 17:20

## 2019-12-19 RX ADMIN — LORAZEPAM 0.25 MG: 0.5 TABLET ORAL at 08:41

## 2019-12-19 RX ADMIN — CALCIUM POLYCARBOPHIL 625 MG: 625 TABLET, FILM COATED ORAL at 08:51

## 2019-12-19 RX ADMIN — ATORVASTATIN CALCIUM 10 MG: 10 TABLET, FILM COATED ORAL at 08:40

## 2019-12-19 RX ADMIN — AMPICILLIN SODIUM AND SULBACTAM SODIUM 3 G: 2; 1 INJECTION, POWDER, FOR SOLUTION INTRAMUSCULAR; INTRAVENOUS at 15:17

## 2019-12-19 RX ADMIN — AMPICILLIN SODIUM AND SULBACTAM SODIUM 3 G: 2; 1 INJECTION, POWDER, FOR SOLUTION INTRAMUSCULAR; INTRAVENOUS at 22:34

## 2019-12-19 RX ADMIN — DOCUSATE SODIUM 100 MG: 100 CAPSULE, LIQUID FILLED ORAL at 00:16

## 2019-12-19 RX ADMIN — CALCIUM 500 MG: 500 TABLET ORAL at 08:39

## 2019-12-19 RX ADMIN — POLYETHYLENE GLYCOL (3350) 17 G: 17 POWDER, FOR SOLUTION ORAL at 08:39

## 2019-12-19 RX ADMIN — CYANOCOBALAMIN TAB 500 MCG 1000 MCG: 500 TAB at 08:39

## 2019-12-19 RX ADMIN — DOCUSATE SODIUM 100 MG: 100 CAPSULE, LIQUID FILLED ORAL at 17:21

## 2019-12-19 RX ADMIN — DOCUSATE SODIUM 100 MG: 100 CAPSULE, LIQUID FILLED ORAL at 09:00

## 2019-12-19 RX ADMIN — Medication 10 ML: at 13:42

## 2019-12-19 RX ADMIN — NORTRIPTYLINE HYDROCHLORIDE 75 MG: 25 CAPSULE ORAL at 22:32

## 2019-12-19 RX ADMIN — CALCIUM POLYCARBOPHIL 625 MG: 625 TABLET, FILM COATED ORAL at 20:49

## 2019-12-19 RX ADMIN — Medication 10 ML: at 06:56

## 2019-12-19 RX ADMIN — AMPICILLIN SODIUM AND SULBACTAM SODIUM 3 G: 2; 1 INJECTION, POWDER, FOR SOLUTION INTRAMUSCULAR; INTRAVENOUS at 07:04

## 2019-12-19 RX ADMIN — RISPERIDONE 0.25 MG: 0.25 TABLET ORAL at 20:49

## 2019-12-19 RX ADMIN — VANCOMYCIN HYDROCHLORIDE 500 MG: 500 INJECTION, POWDER, LYOPHILIZED, FOR SOLUTION INTRAVENOUS at 12:34

## 2019-12-19 RX ADMIN — NORTRIPTYLINE HYDROCHLORIDE 25 MG: 25 CAPSULE ORAL at 18:19

## 2019-12-19 NOTE — PROGRESS NOTES
MELIZA Plan:                 *If MD is in agreement, home to [facility] at d/c              *daughter to transport pt at d/c    CM phoned MD, to request therapy orders. CM will continue to follow.     Al Sor  Ext [phone]

## 2019-12-19 NOTE — PROGRESS NOTES
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 Based on documentation and patient's daughter's impression, it appears that this has improved some. Persistent erythema noted but question whether there is a vascular component to this.   -Blood Cx remain negative at 3 days.  -Currently, will continue empiric IV vancomycin and Unasyn. Await MRSA swab to see whether we can transition vancomycin to an alternate agent (if MRSA negative). -Wound care consult reviewed and appreciated  -ABIs obtained today given flaking skin, reduced pulses, toe wounds, pedal paresthesias -- reduced TBIs noted. Will request vascular surgery input.  -Leg elevation  -Recent ultrasound shows no evidence of DVT  -She may need a different orthotic device since that is contributing to her wounds per patient's daughter. Appears that this will have to happen as an outpatient unless PT/OT have suggestions for alternate assistive devices in setting of drop foot.     Diabetes mellitus type 2 POA- controlled  -Improved glycemic control with insulin-sensitive corrective insulin protocol  -HgbA1c 5.6%!  Not in diabetic range at this time.  -Holding home metformin    Abdominal distention/abnormal exam  -Obtained abdominal US to further assess -- no acute findings  -Ongoing constipation; will add milk of magnesia    Hypertension  Dyslipidemia  Depression  Edema of both legs  Cont home lisinopril  Cont home statin  Cont home torsemide and repeat BMP in AM.      Code Status full code  Surrogate Decision Maker: Daughter Angelina Ivy     DVT Prophylaxis: Heparin        Baseline: From home, independent, from [place]. Discussed with patient and daughter that independent living does not look like a realistic safe option moving forward. Recommended Disposition: Home w/Family and HH PT, OT, RN? TBD     Subjective:     Chief Complaint / Reason for Physician Visit :F/U B/L LE cellulitis, DM  Per daughter, patient's legs are improved, reduced edema and redness. Apparently patient was having issues with medication compliance at home, sometimes forgetting to take them, other times taking them too frequently -- perhaps missed doses of torsemide contributed to leg swelling that daughter noted initially. Continued flaking skin noted. Patient complains of paresthesias in her feet. No nausea, vomiting, diarrhea. Review of Systems:  Symptom Y/N Comments  Symptom Y/N Comments   Fever/Chills n   Chest Pain n    Poor Appetite n   Edema n    Cough n   Abdominal Pain n    Sputum n   Joint Pain     SOB/BERNAL n   Pruritis/Rash y B/L LE redness and flaking   Nausea/vomit    Tolerating PT/OT y    Diarrhea    Tolerating Diet y    Constipation    Other       Could NOT obtain due to:      Objective:     VITALS:   Last 24hrs VS reviewed since prior progress note. Most recent are:  Patient Vitals for the past 24 hrs:   Temp Pulse Resp BP SpO2   12/19/19 1511 97.6 °F (36.4 °C) 68 18 149/51 100 %   12/19/19 1040 97.2 °F (36.2 °C) (!) 57 18 134/40 100 %   12/19/19 0805 97.3 °F (36.3 °C) 60  157/51 100 %   12/19/19 0415 98 °F (36.7 °C) 66 18 145/46 100 %   12/18/19 2249 97.2 °F (36.2 °C) 80 20 138/52 100 %   12/18/19 1913 99.5 °F (37.5 °C) (!) 107 18 124/73 96 %   12/18/19 1908 97.6 °F (36.4 °C) 77 16 163/45 100 %       Intake/Output Summary (Last 24 hours) at 12/19/2019 1712  Last data filed at 12/19/2019 0934  Gross per 24 hour   Intake 600 ml   Output 675 ml   Net -75 ml        PHYSICAL EXAM:  General: WD, WN. Alert, cooperative, no acute distress    EENT:  EOMI. Anicteric sclerae. MMM  Resp:  CTA bilaterally, no wheezing or rales.   No accessory muscle use  CV:  Regular  rhythm,  No edema  GI:  Soft, distended, Non tender.  +Bowel sounds  Neurologic:  Alert and oriented X 3, normal speech, poor short term memory  Psych:   Limited insight and judgment. Not anxious nor agitated  Skin:  No rashes. No jaundice, minimal bilateral lower extremity redness and warmth, trace edema only, persistent RLE ulcer noted    Reviewed most current lab test results and cultures  YES  Reviewed most current radiology test results   YES  Review and summation of old records today    NO  Reviewed patient's current orders and MAR    YES  PMH/SH reviewed - no change compared to H&P  ________________________________________________________________________  Care Plan discussed with:    Comments   Patient x    Family      RN x    Care Manager     Consultant                        Multidiciplinary team rounds were held today with , nursing, pharmacist and clinical coordinator. Patient's plan of care was discussed; medications were reviewed and discharge planning was addressed. ________________________________________________________________________  Total NON critical care TIME:  36   Minutes    Total CRITICAL CARE TIME Spent:   Minutes non procedure based      Comments   >50% of visit spent in counseling and coordination of care     ________________________________________________________________________  Zach Vang MD     Procedures: see electronic medical records for all procedures/Xrays and details which were not copied into this note but were reviewed prior to creation of Plan. LABS:  I reviewed today's most current labs and imaging studies.   Pertinent labs include:  Recent Labs     12/17/19  0249   WBC 9.5   HGB 10.6*   HCT 32.8*        Recent Labs     12/19/19  0133 12/18/19  0252 12/17/19  0249    136 138   K 3.7 4.0 4.4    103 106   CO2 31 28 28   * 110* 103*   BUN 22* 21* 26*   CREA 0.84 0.88 0.86   CA 8.3* 8.5 7.8*       Signed: Michael Arce MD

## 2019-12-19 NOTE — PROGRESS NOTES
Nutrition Assessment:    RECOMMENDATIONS:   Continue Diabetic diet as tolerated    ASSESSMENT:   Chart reviewed. Pt eating very well, 100% of meals. BG . BM noted today. Current intake is likely adequate to meet est needs. Dietitians Intervention(s)/Plan(s): Continue diet and supplement, monitor PO intake  SUBJECTIVE/OBJECTIVE:     Diet Order: Consistent carb, Other (comment)(Glucerna daily )  % Eaten:    Patient Vitals for the past 72 hrs:   % Diet Eaten   12/19/19 0934 100 %   12/18/19 0935 100 %       Pertinent Medications:unasyn, fibercon, Vitamin B12, colace, humalog, miralax, demadex, vancomycin      Chemistries:  Lab Results   Component Value Date/Time    Sodium 139 12/19/2019 01:33 AM    Potassium 3.7 12/19/2019 01:33 AM    Chloride 102 12/19/2019 01:33 AM    CO2 31 12/19/2019 01:33 AM    Anion gap 6 12/19/2019 01:33 AM    Glucose 118 (H) 12/19/2019 01:33 AM    BUN 22 (H) 12/19/2019 01:33 AM    Creatinine 0.84 12/19/2019 01:33 AM    BUN/Creatinine ratio 26 (H) 12/19/2019 01:33 AM    GFR est AA >60 12/19/2019 01:33 AM    GFR est non-AA >60 12/19/2019 01:33 AM    Calcium 8.3 (L) 12/19/2019 01:33 AM    Albumin 3.0 (L) 12/16/2019 03:14 PM      Anthropometrics: Height: 5' 6.93\" (170 cm) Weight: 53 kg (116 lb 13.5 oz)  [] standing scale    []bed scale    []stated   []unknown     IBW (%IBW):   ( ) UBW (%UBW):   (  %)    BMI: Body mass index is 18.34 kg/m². This BMI is indicative of:  [x]Underweight   []Normal   []Overweight   [] Obesity   [] Extreme Obesity (BMI>40)  Estimated Nutrition Needs (Based on): 1325 Kcals/day(BMR: 1025 x 1.3) , 65 g(1.2 g/kg) Protein  Carbohydrate: At Least 130 g/day  Fluids: 1300 mL/day    Last BM: 12/19   [x]Active     []Hyperactive  []Hypoactive       [] Absent   BS  Skin:    [] Intact   [] Incision  [] Breakdown   [] DTI   [] Tears/Excoriation/Abrasion  [x]Edema(+2-BLE) [] Other:    Wt Readings from Last 30 Encounters:   12/16/19 53 kg (116 lb 13.5 oz)   05/07/18 55.3 kg (122 lb)        Dx of Malnutrition since admission: no    NUTRITION DIAGNOSES:   Problem:  No nutritional diagnosis at this time          NUTRITION INTERVENTIONS:  Meals/Snacks: General/healthful diet   Supplements: Commercial supplement              GOAL:   Pt will consume >50% of meals/supplements in 4-6 days.      NUTRITION MONITORING AND EVALUATION   Previous Goal: consume >50% of meals in 3-5 days   Previous Goal Met: Yes   Previous Recommendations Implemented: Yes   Cultural, Lutheran, or Ethnic Dietary Needs: None   LEARNING NEEDS (Diet, Food/Nutrient-Drug Interaction):    [x] None Identified   [] Identified and Education Provided/Documented   [] Identified and Pt declined/was not appropriate      [x] Interdisciplinary Care Plan Reviewed/Documented    [x] Participated in Discharge Planning: Diabetic diet + Glucerna daily    [] Interdisciplinary Rounds     NUTRITION RISK:    [] High              [x] Moderate           [x]  Low  []  Minimal/Uncompromised      Daryn Gao RD, [address]  Pager [phone]  Weekend Pager [phone]

## 2019-12-19 NOTE — DIABETES MGMT
[facility]    [facility] Progress Note    Recommendations/ Comments:Noted POC glucose at lunch yesterday of 335 mg/dl treated with 7 units of correction per scale and resulting in a low BS with 100 % of breakfast eaten. Noted scale changed to high sensitivity. Will continue to follow trends. Current hospital DM medication: lispro insulin correction scale high sensitivity    Chart reviewed on Candace Abbott. Patient is a 81 y.o. female with known  Type 2 Diabetes on oral agent (monotherapy): glumetza  mg daily at home. A1c:   Lab Results   Component Value Date/Time    Hemoglobin A1c 5.6 12/19/2019 01:33 AM    Hemoglobin A1c 5.7 (H) 05/07/2018 10:26 AM       Recent Glucose Results:   Lab Results   Component Value Date/Time     (H) 12/19/2019 01:33 AM    GLUCPOC 160 (H) 12/19/2019 10:37 AM    GLUCPOC 108 (H) 12/19/2019 07:20 AM    GLUCPOC 142 (H) 12/18/2019 08:57 PM        Lab Results   Component Value Date/Time    Creatinine 0.84 12/19/2019 01:33 AM     Estimated Creatinine Clearance: 42.5 mL/min (based on SCr of 0.84 mg/dL). Active Orders   Diet    DIET DIABETIC CONSISTENT CARB Regular        PO intake:   Patient Vitals for the past 72 hrs:   % Diet Eaten   12/19/19 0934 100 %   12/18/19 0935 100 %       Will continue to follow as needed.     Thank you

## 2019-12-19 NOTE — PROGRESS NOTES
Skin tear to left elbow/forearm from patient scratching. Mepilex applied. Wound care done on BLE per order. Legs cleaned with CHG soap, moisturizing lotion applied, and xeroform/4x4/darci applied to BLE.

## 2019-12-19 NOTE — PROGRESS NOTES
Verbal shift change report given to Beulah Callejas (oncoming nurse) by Keyur Hendrix (offgoing nurse). Report included the following information SBAR, Kardex, Intake/Output, MAR and Recent Results     Wound care orders performed.

## 2019-12-19 NOTE — PROGRESS NOTES
General Surgery End of Shift Nursing Note    Bedside shift change report given to Aaron Elizalde by Dariusz Gann RN. Report included the following information SBAR, Kardex, Intake/Output and MAR. Shift worked:  7p-7a   Summary of shift:    Patient sundowning, confused, did not know where she was or why she was here   Issues for physician to address:   none     Number times ambulated in hallway past shift: 0    Number of times OOB to chair past shift: 0    Pain Management:  Current medication: none  Patient states pain is manageable on current pain medication: YES    GI:    Current diet:  DIET DIABETIC CONSISTENT CARB Regular  DIET NUTRITIONAL SUPPLEMENTS No; Lunch; Glucerna Shake  DIET ONE TIME MESSAGE    Tolerating current diet: YES  Passing flatus: YES  Last Bowel Movement: 12/18/19 @  22:10,  Appearance: hard, round and dark brown, size of a soft ball, hypoactive bowel sounds x 4 quads, abdomen is distended and hard, patient denies pain or tenderness. Encouraged fluids to help with movement of bowels. @23:58 hard formed size of an orange, given 100mg colace PRN, 12/19/19 @ 01:37 hard, formed, dark brown size of a soft ball, @ 04:15 hard, formed, dark brown and green size of a soft ball. Hypoactive bowel sounds in (L) upper and lower quadrants, abdomen still distended and hard in the transverse and descending colon. Respiratory:    Incentive Spirometer at bedside: NO  Patient instructed on use: NO    Patient Safety:    Falls Score:4  Bed Alarm On? No.  Bed Alarm placed @ 0300  Sitter?  No    Brayan Nelson LPN

## 2019-12-19 NOTE — PROGRESS NOTES
Pharmacy Automatic Renal Dosing Protocol - Antimicrobials    Indication for Antimicrobials: skin and soft tissue     Current Regimen of Each Antimicrobial:  Unasyn 3g IV every 8hr (Start Date ; Day # 4/7)  Vancomycin 1g ONCE, then 500 mg q16h (start date: , day #4)    Previous Antimicrobial Therapy:    Goal Level: VANCOMYCIN TROUGH GOAL RANGE    Vancomycin Trough: 10 - 15 mcg/mL  (AUC: 400 - 600 mg/hr/Liter/day)     Date Dose & Interval Measured (mcg/mL) Extrapolated (mcg/mL)   19 1049 500 mg q16h 7.8 accurate                 Date & time of next level:  1100    Significant Cultures:   : paired blood - NGX3d- prelim    Radiology / Imaging results: (X-ray, CT scan or MRI):   : chest xray: no acute process    Paralysis, amputations, malnutrition: none    Labs:  Recent Labs     19  0133 19  0252 19  0249 19  1514   CREA 0.84 0.88 0.86 0.96   BUN 22* 21* 26* 26*   WBC  --   --  9.5 10.8     Temp (24hrs), Av.7 °F (36.5 °C), Min:97.2 °F (36.2 °C), Max:99.5 °F (37.5 °C)    Creatinine Clearance (mL/min) or Dialysis: 41 ml/min    Impression/Plan:   Continue unasyn per renal above for renal function and indication  CBC and BMP  Renal stable  Level prior to  1100= 7.8, sub therapeutic, will adjust regimen to 750 mg q16h to maintain trough b/t 10-15  Antimicrobial stop date 7 days      Pharmacy will follow daily and adjust medications as appropriate for renal function and/or serum levels. Thank you,  Ifeoma Pompa II, PHARMD    Recommended duration of therapy  [email or web address]. [email or web address]    Renal Dosing  [email or web address]/[facility]/Pharmacy/[email or web address]/Renal%[id number]. pdf

## 2019-12-20 LAB
ANION GAP SERPL CALC-SCNC: 4 MMOL/L (ref 5–15)
BUN SERPL-MCNC: 20 MG/DL (ref 6–20)
BUN/CREAT SERPL: 29 (ref 12–20)
CALCIUM SERPL-MCNC: 8 MG/DL (ref 8.5–10.1)
CHLORIDE SERPL-SCNC: 102 MMOL/L (ref 97–108)
CHOLEST SERPL-MCNC: 134 MG/DL
CO2 SERPL-SCNC: 31 MMOL/L (ref 21–32)
CREAT SERPL-MCNC: 0.69 MG/DL (ref 0.55–1.02)
GLUCOSE BLD STRIP.AUTO-MCNC: 134 MG/DL (ref 65–100)
GLUCOSE BLD STRIP.AUTO-MCNC: 134 MG/DL (ref 65–100)
GLUCOSE BLD STRIP.AUTO-MCNC: 147 MG/DL (ref 65–100)
GLUCOSE BLD STRIP.AUTO-MCNC: 96 MG/DL (ref 65–100)
GLUCOSE SERPL-MCNC: 102 MG/DL (ref 65–100)
HDLC SERPL-MCNC: 71 MG/DL
HDLC SERPL: 1.9 {RATIO} (ref 0–5)
LDLC SERPL CALC-MCNC: 51 MG/DL (ref 0–100)
LIPID PROFILE,FLP: NORMAL
POTASSIUM SERPL-SCNC: 3.5 MMOL/L (ref 3.5–5.1)
SERVICE CMNT-IMP: ABNORMAL
SERVICE CMNT-IMP: NORMAL
SODIUM SERPL-SCNC: 137 MMOL/L (ref 136–145)
TRIGL SERPL-MCNC: 60 MG/DL (ref ?–150)
VLDLC SERPL CALC-MCNC: 12 MG/DL

## 2019-12-20 PROCEDURE — 74011000258 HC RX REV CODE- 258: Performed by: INTERNAL MEDICINE

## 2019-12-20 PROCEDURE — 74011250637 HC RX REV CODE- 250/637: Performed by: INTERNAL MEDICINE

## 2019-12-20 PROCEDURE — 74011250636 HC RX REV CODE- 250/636: Performed by: INTERNAL MEDICINE

## 2019-12-20 PROCEDURE — 94760 N-INVAS EAR/PLS OXIMETRY 1: CPT

## 2019-12-20 PROCEDURE — 77030038269 HC DRN EXT URIN PURWCK BARD -A

## 2019-12-20 PROCEDURE — 80061 LIPID PANEL: CPT

## 2019-12-20 PROCEDURE — 97535 SELF CARE MNGMENT TRAINING: CPT | Performed by: OCCUPATIONAL THERAPIST

## 2019-12-20 PROCEDURE — 65660000000 HC RM CCU STEPDOWN

## 2019-12-20 PROCEDURE — 97530 THERAPEUTIC ACTIVITIES: CPT

## 2019-12-20 PROCEDURE — 97116 GAIT TRAINING THERAPY: CPT

## 2019-12-20 PROCEDURE — 97165 OT EVAL LOW COMPLEX 30 MIN: CPT | Performed by: OCCUPATIONAL THERAPIST

## 2019-12-20 PROCEDURE — 97161 PT EVAL LOW COMPLEX 20 MIN: CPT

## 2019-12-20 PROCEDURE — 80048 BASIC METABOLIC PNL TOTAL CA: CPT

## 2019-12-20 PROCEDURE — 36415 COLL VENOUS BLD VENIPUNCTURE: CPT

## 2019-12-20 PROCEDURE — 82962 GLUCOSE BLOOD TEST: CPT

## 2019-12-20 RX ORDER — CEPHALEXIN 250 MG/1
500 CAPSULE ORAL EVERY 6 HOURS
Status: COMPLETED | OUTPATIENT
Start: 2019-12-20 | End: 2019-12-22

## 2019-12-20 RX ORDER — DOXYCYCLINE HYCLATE 100 MG
100 TABLET ORAL EVERY 12 HOURS
Status: COMPLETED | OUTPATIENT
Start: 2019-12-20 | End: 2019-12-22

## 2019-12-20 RX ADMIN — AMPICILLIN SODIUM AND SULBACTAM SODIUM 3 G: 2; 1 INJECTION, POWDER, FOR SOLUTION INTRAMUSCULAR; INTRAVENOUS at 06:35

## 2019-12-20 RX ADMIN — TORSEMIDE 5 MG: 20 TABLET ORAL at 18:06

## 2019-12-20 RX ADMIN — LORAZEPAM 0.25 MG: 0.5 TABLET ORAL at 18:07

## 2019-12-20 RX ADMIN — AMPICILLIN SODIUM AND SULBACTAM SODIUM 3 G: 2; 1 INJECTION, POWDER, FOR SOLUTION INTRAMUSCULAR; INTRAVENOUS at 15:27

## 2019-12-20 RX ADMIN — DOXYCYCLINE HYCLATE 100 MG: 100 TABLET, COATED ORAL at 21:35

## 2019-12-20 RX ADMIN — CYANOCOBALAMIN TAB 500 MCG 1000 MCG: 500 TAB at 09:11

## 2019-12-20 RX ADMIN — ATORVASTATIN CALCIUM 10 MG: 10 TABLET, FILM COATED ORAL at 09:11

## 2019-12-20 RX ADMIN — NORTRIPTYLINE HYDROCHLORIDE 75 MG: 25 CAPSULE ORAL at 21:35

## 2019-12-20 RX ADMIN — Medication 10 ML: at 21:35

## 2019-12-20 RX ADMIN — DOCUSATE SODIUM 100 MG: 100 CAPSULE, LIQUID FILLED ORAL at 09:11

## 2019-12-20 RX ADMIN — POLYETHYLENE GLYCOL (3350) 17 G: 17 POWDER, FOR SOLUTION ORAL at 09:10

## 2019-12-20 RX ADMIN — CEPHALEXIN 500 MG: 250 CAPSULE ORAL at 18:06

## 2019-12-20 RX ADMIN — Medication 10 ML: at 06:34

## 2019-12-20 RX ADMIN — DOCUSATE SODIUM 100 MG: 100 CAPSULE, LIQUID FILLED ORAL at 18:05

## 2019-12-20 RX ADMIN — Medication 10 ML: at 15:27

## 2019-12-20 RX ADMIN — CALCIUM 500 MG: 500 TABLET ORAL at 09:11

## 2019-12-20 RX ADMIN — CALCIUM POLYCARBOPHIL 625 MG: 625 TABLET, FILM COATED ORAL at 09:22

## 2019-12-20 RX ADMIN — HEPARIN SODIUM 5000 UNITS: 5000 INJECTION INTRAVENOUS; SUBCUTANEOUS at 21:36

## 2019-12-20 RX ADMIN — HEPARIN SODIUM 5000 UNITS: 5000 INJECTION INTRAVENOUS; SUBCUTANEOUS at 09:12

## 2019-12-20 RX ADMIN — VANCOMYCIN HYDROCHLORIDE 750 MG: 750 INJECTION, POWDER, LYOPHILIZED, FOR SOLUTION INTRAVENOUS at 03:34

## 2019-12-20 RX ADMIN — LISINOPRIL 40 MG: 40 TABLET ORAL at 09:11

## 2019-12-20 RX ADMIN — LORAZEPAM 0.25 MG: 0.5 TABLET ORAL at 09:11

## 2019-12-20 RX ADMIN — RALOXIFENE HYDROCHLORIDE 60 MG: 60 TABLET, FILM COATED ORAL at 09:22

## 2019-12-20 NOTE — PROGRESS NOTES
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 Based on documentation and patient's daughter's impression, it appears that this has improved some. Persistent erythema noted but question whether there is a vascular component to this.   -Blood Cx remain negative at 4 days.  -Has been on IV vancomycin and Unasyn; patient remains stable. I'll transition her to Keflex and doxycycline in absence of an isolated organism.  -Wound care consult reviewed and appreciated  -ABIs/TBIs reviewed and vascular surgery consult obtained. Appreciate input. Appears outpatient follow up likely.   -Leg elevation  -Recent ultrasound shows no evidence of DVT  -She may need a different orthotic device since that is contributing to her wounds per patient's daughter. Appears that this will have to happen as an outpatient unless PT/OT have suggestions for alternate assistive devices.     Diabetes mellitus type 2 POA- controlled  -Improved glycemic control with insulin-sensitive corrective insulin protocol  -HgbA1c 5.6%! Not in diabetic range at this time.  -Holding home metformin    Abdominal distention/abnormal exam  -Obtained abdominal US to further assess -- no acute findings  -Constipation improved with MOM    Hypertension  Dyslipidemia  Depression  Edema of both legs  Cont home lisinopril  Cont home statin  Cont home torsemide. Minimal ongoing edema at this time. Unclear if patient has CHF -- few records available. Will obtain echo.      Code Status full code  Surrogate Decision Maker: Daughter Darlin Guardian     DVT Prophylaxis: Heparin        Baseline: From home, independent, from [place].  Discussed with patient and daughter that independent living does not look like a realistic safe option moving forward. Recommended Disposition: Home w/Family and HH PT, OT, RN? TBD     Subjective:     Chief Complaint / Reason for Physician Visit :F/U B/L LE cellulitis, DM  States she feels pretty well today. Thinks her legs look better. Had a BM and feels relieved with that. Review of Systems:  Symptom Y/N Comments  Symptom Y/N Comments   Fever/Chills n   Chest Pain n    Poor Appetite n   Edema n    Cough n   Abdominal Pain n    Sputum    Joint Pain     SOB/BERNAL n   Pruritis/Rash y B/L LE redness and flaking   Nausea/vomit    Tolerating PT/OT y    Diarrhea    Tolerating Diet y    Constipation n   Other       Could NOT obtain due to:      Objective:     VITALS:   Last 24hrs VS reviewed since prior progress note. Most recent are:  Patient Vitals for the past 24 hrs:   Temp Pulse Resp BP SpO2   12/20/19 1115 97.3 °F (36.3 °C) (!) 59 16 95/66 100 %   12/20/19 0714 97.5 °F (36.4 °C) 61 16 153/61 100 %   12/20/19 0310 97.5 °F (36.4 °C) 62 17 143/60 99 %   12/19/19 2342 97.1 °F (36.2 °C) 67 18 136/51 99 %   12/19/19 1910 97.5 °F (36.4 °C) 83 18 168/58 98 %   12/19/19 1511 97.6 °F (36.4 °C) 68 18 149/51 100 %       Intake/Output Summary (Last 24 hours) at 12/20/2019 1506  Last data filed at 12/20/2019 0903  Gross per 24 hour   Intake 200 ml   Output 2000 ml   Net -1800 ml        PHYSICAL EXAM:  General: WD, WN. Alert, cooperative, no acute distress    EENT:  EOMI. Anicteric sclerae. MMM  Resp:  CTA bilaterally, no wheezing or rales. No accessory muscle use  CV:  Regular  rhythm,  No edema  GI:  Soft, distended, Non tender.  +Bowel sounds  Neurologic:  Alert, normal speech, poor short term memory  Psych:   Limited insight and judgment. Not anxious nor agitated  Skin:  No rashes. No jaundice, minimal bilateral lower extremity redness and warmth, trace edema only, persistent RLE ulcer noted. Less skin flaking.     Reviewed most current lab test results and cultures  YES  Reviewed most current radiology test results   YES  Review and summation of old records today    NO  Reviewed patient's current orders and MAR    YES  PMH/SH reviewed - no change compared to H&P  ________________________________________________________________________  Care Plan discussed with:    Comments   Patient x    Family      RN     Care Manager     Consultant                        Multidiciplinary team rounds were held today with , nursing, pharmacist and clinical coordinator. Patient's plan of care was discussed; medications were reviewed and discharge planning was addressed. ________________________________________________________________________  Total NON critical care TIME:  25   Minutes    Total CRITICAL CARE TIME Spent:   Minutes non procedure based      Comments   >50% of visit spent in counseling and coordination of care     ________________________________________________________________________  Jerome Sherman MD     Procedures: see electronic medical records for all procedures/Xrays and details which were not copied into this note but were reviewed prior to creation of Plan. LABS:  I reviewed today's most current labs and imaging studies. Pertinent labs include:  No results for input(s): WBC, HGB, HCT, PLT, HGBEXT, HCTEXT, PLTEXT, HGBEXT, HCTEXT, PLTEXT in the last 72 hours.   Recent Labs     12/20/19  0412 12/19/19  0133 12/18/19  0252    139 136   K 3.5 3.7 4.0    102 103   CO2 31 31 28   * 118* 110*   BUN 20 22* 21*   CREA 0.69 0.84 0.88   CA 8.0* 8.3* 8.5       Signed: Jerome Sherman MD

## 2019-12-20 NOTE — CONSULTS
Vascular Surgery Consult Note  12/20/2019    Subjective:     Colonel Martins is a 81 y.o.  female w/ a pmhx significant for HTN, HLD, and DM. She was on insulin for many years until an intentional weight loss of 160 lbs. It is now controlled w/ oral agents. She has never smoked. She denies a hx of CAD or CVA. She has diabetic neuropathy and chronic bilateral \"toe drop\" that she wears bilateral leg braces for that rest on her shins. She routinely lives in [facility] in a second floor home w/ 14 stairs to enter. She routine is independent of her ADLs and rides a tricycle daily. Her dtr flys from her home in [place] [address] to [facility] to check on her mother and manage her medications every 5-7 days. On her most recent trip she noted her mother's legs were sore and edematous with open wounds that were making it difficult for her to ambulate. She started using a cane. In the last few months she has also noticed intermittent periods of memory loss and at times her mother will not answer her phone. On one occasion it prompted a welfare check from her dtr who flew down the same day to check on her. On this last visit her condition was so poor she brought her back to [place] for evaluation. She was unable to see her PCP due to multiple patient's in the office w/ the flu. She saw her endocrinologist who directed her to the [facility]. They were unable to see her for > 5 days therefore the dtr brought the patient to the emergency room for evaluation. She was diagnosed w/ BLE cellulitis and referred for admission. She has ABIs which were normal bilateral.  Her TBIs are slightly reduced at 0.40 bilaterally.       Past Medical and Procedural History  Diabetes Mellitus   -no longer insulin dependent  Hypertension   Hyperlipidemia   Morbid obesity  -resolved      Family History   Problem Relation Age of Onset    Dementia Maternal Grandmother       Social History     Tobacco Use    Smoking status: Never Smoker    Smokeless tobacco: Never Used   Substance Use Topics    Alcohol use: No     Patient routinely lives alone in [place] [address] w/ her dtr checking on her regularly. She is routinely independent of her ADLs. She wears leg braces and does not use any assistance devices. She is very active and is a volunteer. She rides a tricycle daily. Prior to Admission medications    Medication Sig Start Date End Date Taking? Authorizing Provider   [facility] AT [facility]) 500 mg TG24 24 hour tablet Take 500 mg by mouth daily. Yes Provider, Historical   nortriptyline (PAMELOR) 25 mg capsule Take 25 mg by mouth daily (with dinner). Yes Provider, Historical   nortriptyline (PAMELOR) 25 mg capsule Take 75 mg by mouth nightly. Yes Provider, Historical   lisinopril (PRINIVIL, ZESTRIL) 20 mg tablet Take 40 mg by mouth daily. Yes Provider, Historical   risperiDONE (RISPERDAL) 0.25 mg tablet Take 0.25 mg by mouth every other day. Yes Provider, Historical   torsemide (DEMADEX) 5 mg tablet Take 5 mg by mouth every evening. Yes Provider, Historical   cephALEXin (KEFLEX) 500 mg capsule Take 500 mg by mouth two (2) times a day. Yes Provider, Historical   trimethoprim-sulfamethoxazole (BACTRIM DS) 160-800 mg per tablet Take 1 Tab by mouth two (2) times a day. Yes Provider, Historical   LORazepam (ATIVAN) 0.5 mg tablet Take 0.25 mg by mouth two (2) times a day. Yes Provider, Historical   raloxifene (EVISTA) 60 mg tablet Take 60 mg by mouth daily. Yes Provider, Historical   calcium carbonate (CALCIUM 500 PO) Take 1 Tab by mouth daily. Yes Provider, Historical   docusate sodium (COLACE) 100 mg capsule Take 100 mg by mouth two (2) times a day. Yes Provider, Historical   aspirin delayed-release 81 mg tablet Take 1 Tab by mouth every other day. Yes Provider, Historical   calcium polycarbophil (FIBERCON PO) Take 1 Tab by mouth two (2) times a day.    Yes Provider, Historical cyanocobalamin 1,000 mcg tablet Take 1,000 mcg by mouth daily. Yes Provider, Historical   alpha-D-galactosidase (BEANO PO) Take 1 Tab by mouth daily. Yes Provider, Historical   atorvastatin (LIPITOR) 10 mg tablet Take 10 mg by mouth daily. Yes Provider, Historical     No Known Allergies     Review of Systems   Constitutional: Positive for activity change and fatigue. Negative for chills and fever. HENT: Negative for congestion. Eyes: Negative for visual disturbance. Respiratory: Negative for cough, chest tightness and shortness of breath. Cardiovascular: Positive for leg swelling. Negative for chest pain. Gastrointestinal: Negative for nausea and vomiting. Endocrine: Negative for polydipsia and polyuria. Genitourinary: Negative. Musculoskeletal: Positive for gait problem and joint swelling. Negative for arthralgias and myalgias. Skin: Positive for color change and wound. Allergic/Immunologic: Negative. Neurological: Positive for weakness. Hematological: Negative. Psychiatric/Behavioral: Negative. Objective:       Patient Vitals for the past 24 hrs:   BP Temp Pulse Resp SpO2   12/20/19 0714 153/61 97.5 °F (36.4 °C) 61 16 100 %   12/20/19 0310 143/60 97.5 °F (36.4 °C) 62 17 99 %   12/19/19 2342 136/51 97.1 °F (36.2 °C) 67 18 99 %   12/19/19 1910 168/58 97.5 °F (36.4 °C) 83 18 98 %   12/19/19 1511 149/51 97.6 °F (36.4 °C) 68 18 100 %   12/19/19 1040 134/40 97.2 °F (36.2 °C) (!) 57 18 100 %     Physical Exam  Constitutional:       Comments: Thin   HENT:      Head: Normocephalic. Nose: Nose normal.      Mouth/Throat:      Mouth: Mucous membranes are moist.   Eyes:      Pupils: Pupils are equal, round, and reactive to light. Neck:      Musculoskeletal: Normal range of motion. Cardiovascular:      Rate and Rhythm: Normal rate and regular rhythm. Pulmonary:      Effort: Pulmonary effort is normal.      Breath sounds: Normal breath sounds.    Abdominal:      General: Abdomen is flat. Palpations: Abdomen is soft. Musculoskeletal: Normal range of motion. Skin:     Comments: Multiple open ulcers to the bilateral lower shins and ulceration to the the right second toe. Sloughing of the skin of the bilateral toes. Neurological:      General: No focal deficit present. Mental Status: She is alert. Comments: Oriented to name and place. She follows commands. Psychiatric:         Mood and Affect: Mood normal.         Speech: Speech normal.         Behavior: Behavior normal.         Cognition and Memory: Memory is impaired.        Pertinent Test Results:   Recent Results (from the past 24 hour(s))   GLUCOSE, POC    Collection Time: 12/19/19 10:37 AM   Result Value Ref Range    Glucose (POC) 160 (H) 65 - 100 mg/dL    Performed by Diego Owusu (PCT)    VANCOMYCIN, TROUGH    Collection Time: 12/19/19 10:49 AM   Result Value Ref Range    Vancomycin,trough 7.8 5.0 - 10.0 ug/mL    Reported dose date: 20191218      Reported dose time: 1900      Reported dose: 500 MG UNITS   GLUCOSE, POC    Collection Time: 12/19/19  4:32 PM   Result Value Ref Range    Glucose (POC) 124 (H) 65 - 100 mg/dL    Performed by Diego Owusu (PCT)    GLUCOSE, POC    Collection Time: 12/19/19  8:35 PM   Result Value Ref Range    Glucose (POC) 138 (H) 65 - 100 mg/dL    Performed by Sanford Goodell PCT    METABOLIC PANEL, BASIC    Collection Time: 12/20/19  4:12 AM   Result Value Ref Range    Sodium 137 136 - 145 mmol/L    Potassium 3.5 3.5 - 5.1 mmol/L    Chloride 102 97 - 108 mmol/L    CO2 31 21 - 32 mmol/L    Anion gap 4 (L) 5 - 15 mmol/L    Glucose 102 (H) 65 - 100 mg/dL    BUN 20 6 - 20 MG/DL    Creatinine 0.69 0.55 - 1.02 MG/DL    BUN/Creatinine ratio 29 (H) 12 - 20      GFR est AA >60 >60 ml/min/1.73m2    GFR est non-AA >60 >60 ml/min/1.73m2    Calcium 8.0 (L) 8.5 - 10.1 MG/DL   LIPID PANEL    Collection Time: 12/20/19  4:12 AM   Result Value Ref Range    LIPID PROFILE Cholesterol, total 134 <200 MG/DL    Triglyceride 60 <150 MG/DL    HDL Cholesterol 71 MG/DL    LDL, calculated 51 0 - 100 MG/DL    VLDL, calculated 12 MG/DL    CHOL/HDL Ratio 1.9 0.0 - 5.0     GLUCOSE, POC    Collection Time: 12/20/19  7:19 AM   Result Value Ref Range    Glucose (POC) 96 65 - 100 mg/dL    Performed by Mirella Lewis (PCT)        Assessmen/Plan:     Consult problem:  BLE cellulitis w/ ulceration   -possible lymphedema and/or venous insufficiency  -exacerbated by leg brace use for chronic toe drop  -underlying diabetic neuropathy   -failed tx w/ diurectic     Distal large vessel occlusive disease vs microvascular disease exacerbated by BLE edema. Dr. Nikos Dunn to see patient later today to determine need for BLE arteriogram as an outpatient. Likely would benefit from BLE venous duplex w/ reflux as an outpatient as well. Recommend echocardiogram to exclude diastolic CHF as a source of her BLE edema. Will defer this decision to primary team.  Agree w/ ASA and statin. Antibx per primary team.  Wound care per wound care team.      Active problems  Diabetes Mellitus   -controlled  -HA1c 5.6  Hyponatremia  -resolved   Hypertension   -stable on ACE  Hyperlipidemia   -LDL 51 on statin   Macrocytic Anemia   Anxiety  -w/ benzodiazepine and antipsychotic dependent   Unsteady gait  -w/ toe drop  Memory loss  -patient may benefit from outpatient neurology evaluation   Management of comorbid conditions by primary team.    VTE Prophylaxis:  [facility]    Disposition:  Patient would benefit from home w/ home health for PT and wound care.      Signed By: Artemio Alejandre NP     December 20, 2019

## 2019-12-20 NOTE — PROGRESS NOTES
Occupational Therapy  Orders received and medical record reviewed. Pt participated in OT Evaluation and will benefit from 24 hour assistance and HH OT and PT at discharge. If assistance cannot be provided at home,  recommend SNF rehab at discharge.
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
## 2019-12-20 NOTE — PROGRESS NOTES
General Surgery End of Shift Nursing Note      Shift worked:  [id number]   Summary of shift:  No acute events. Slept well. Gets more confused at night (baseline dementia). 2 more very large, hard BM's. Issues for physician to address:  None     Number times ambulated in hallway past shift: 0    Number of times OOB to chair past shift: 0    Pain Management:  Current medication: None   Patient states pain is manageable on current pain medication: YES    GI:    Current diet:  DIET DIABETIC CONSISTENT CARB Regular  DIET NUTRITIONAL SUPPLEMENTS No; Lunch; Glucerna Shake  DIET ONE TIME MESSAGE    Tolerating current diet: YES  Passing flatus: YES  Last Bowel Movement: yesterday   Appearance: Large hard. Patient Safety:    Falls Score: 4  Bed Alarm On? Yes  Sitter?  No    Rosario Caro RN

## 2019-12-20 NOTE — PROGRESS NOTES
Physical Therapy  Order received and chart reviewed. PT evaluation completed and recommend HHPT with 24/7 Supervision/assistance vs SNF rehab.

## 2019-12-20 NOTE — PROGRESS NOTES
Wound care dressing change complete as ordered. Washed with CHG soap, patted dry, moisturizer, xerform, non adherent, kerlix wrap.

## 2019-12-20 NOTE — PROGRESS NOTES
Problem: Mobility Impaired (Adult and Pediatric)  Goal: *Acute Goals and Plan of Care (Insert Text)  Description  . FUNCTIONAL STATUS PRIOR TO ADMISSION: Patient was modified independent using a single point cane for functional mobility. HOME SUPPORT PRIOR TO ADMISSION: The patient lived alone with daughter to provide assistance. Daughter traveled to pt's home weekly. Physical Therapy Goals  Initiated 12/20/2019  1. Patient will move from supine to sit and sit to supine , scoot up and down and roll side to side in bed with independence within 7 day(s). 2.  Patient will transfer from bed to chair and chair to bed with independence using the least restrictive device within 7 day(s). 3.  Patient will perform sit to stand with modified independence within 7 day(s). 4.  Patient will ambulate with stand by assist for 150 feet with the least restrictive device within 7 day(s). 5.  Patient will ascend/descend 14 stairs with 1 handrail(s) with stand by assist within 7 day(s). Outcome: Progressing Towards Goal   PHYSICAL THERAPY EVALUATION  Patient: Sharita Stark (31 y.o. female)  Date: 12/20/2019  Primary Diagnosis: Bilateral lower leg cellulitis [L03.116, L03.115]        Precautions:          ASSESSMENT  Based on the objective data described below, the patient presents with generalized weakness, impaired memory, decreased safety judgement/awareness, impaired gait, and decreased activity tolerance. Pt cleared by nursing and agreeable to PT intervention. Pt wears B AFOs at baseline, but it has been determined that they are causing some of her skin breakdown. Pt instructed how to compensate with steppage gait for ambulation with good carryover, however with her impaired memory not safe to ambulate alone    Current Level of Function Impacting Discharge (mobility/balance): cga/sba to min a x 1 with verbal safety cues    Functional Outcome Measure:   The patient scored 35/100 on the Victorino outcome measure which is indicative of 65% impaired function/adls    Other factors to consider for discharge: lives alone, impaired memory, decreased safety     Patient will benefit from skilled therapy intervention to address the above noted impairments. PLAN :  Recommendations and Planned Interventions: bed mobility training, transfer training, gait training, therapeutic exercises, patient and family training/education, and therapeutic activities      Frequency/Duration: Patient will be followed by physical therapy:  4 times a week to address goals. Recommendation for discharge: (in order for the patient to meet his/her long term goals)  Physical therapy at least 2 days/week in the home AND ensure assist and/or supervision for safety with all mobility vs SNF rehab     This discharge recommendation:  A follow-up discussion with the attending provider and/or case management is planned    IF patient discharges home will need the following DME: orthotic device and rolling walker         SUBJECTIVE:   Patient stated I have 14 steps I have to do at home    OBJECTIVE DATA SUMMARY:   HISTORY:    Past Medical History:   Diagnosis Date    Depression     Diabetes ([facility] [address]     Hypercholesteremia    No past surgical history on file.     Personal factors and/or comorbidities impacting plan of care: cognition, impaired safety judgement/awareness    Home Situation  Home Environment: Private residence  # Steps to Enter: (15)  Rails to Enter: Yes(pt pulls self up)  One/Two Story Residence: Two story  Living Alone: Yes(in [place] lives alone)  Support Systems: Family member(s)(daughter)  Patient Expects to be Discharged to[de-identified] Private residence  Current DME Used/Available at Home: Brace/Splint    EXAMINATION/PRESENTATION/DECISION MAKING:   Critical Behavior:  Neurologic State: Alert  Orientation Level: Oriented to person  Cognition: Decreased attention/concentration, Follows commands, Impaired decision making, Memory loss, Poor safety awareness(hx of dementia)  Safety/Judgement: Decreased awareness of environment, Decreased awareness of need for assistance, Decreased awareness of need for safety, Fall prevention  Hearing: Auditory  Auditory Impairment: None  Range Of Motion:  AROM: Generally decreased, functional           PROM: Generally decreased, functional           Strength:    Strength: Generally decreased, functional(Hip flexors 3/5)                    Tone & Sensation:   Tone: Normal              Sensation: Impaired(B foot neuropathy)               Coordination:  Coordination: Within functional limits  Vision:   Acuity: Able to read clock/calendar on wall without difficulty; Impaired near vision  Corrective Lenses: Reading glasses  Functional Mobility:  Bed Mobility:  Rolling: Stand-by assistance  Supine to Sit: Stand-by assistance     Scooting: Stand-by assistance  Transfers:  Sit to Stand: Contact guard assistance  Stand to Sit: Contact guard assistance(verbal safety cues)        Balance:   Sitting: Intact  Standing: Impaired; With support  Standing - Static: Constant support;Fair;Occasional  Standing - Dynamic : Constant support; Fair  Ambulation/Gait Training:  Distance (ft): 32 Feet (ft)  Assistive Device: Gait belt;Walker, rolling  Ambulation - Level of Assistance: Contact guard assistance     Gait Description (WDL): Exceptions to WDL  Gait Abnormalities: Decreased step clearance; Foot drop; Steppage gait        Base of Support: Narrowed  Stance: Left decreased;Right decreased  Speed/Jannet: Pace decreased (<100 feet/min)  Step Length: Left shortened;Right shortened  Swing Pattern: Left asymmetrical;Right asymmetrical        Functional Measure:  Barthel Index:    Bathin  Bladder: 0  Bowels: 0  Groomin  Dressin  Feeding: 10  Mobility: 0  Stairs: 0  Toilet Use: 5  Transfer (Bed to Chair and Back): 10  Total: 35/100       The Barthel ADL Index: Guidelines  1.  The index should be used as a record of what a patient does, not as a record of what a patient could do. 2. The main aim is to establish degree of independence from any help, physical or verbal, however minor and for whatever reason. 3. The need for supervision renders the patient not independent. 4. A patient's performance should be established using the best available evidence. Asking the patient, friends/relatives and nurses are the usual sources, but direct observation and common sense are also important. However direct testing is not needed. 5. Usually the patient's performance over the preceding 24-48 hours is important, but occasionally longer periods will be relevant. 6. Middle categories imply that the patient supplies over 50 per cent of the effort. 7. Use of aids to be independent is allowed. Monica Shepard., Barthel, DJaleesaW. (4257). Functional evaluation: the Barthel Index. [facility] (14)2. DAVINA Parker, Lida Sidhu., Sam Matthews., Richie, [facility] [facility] (1999). Measuring the change indisability after inpatient rehabilitation; comparison of the responsiveness of the Barthel Index and Functional [place] Measure. Journal of Neurology, Neurosurgery, and Psychiatry, 66(4), 872-117. Homa Escalante, N.J.A, JOE Maldonado.SATHISH, & Taryn Quinonez, M.A. (2004.) Assessment of post-stroke quality of life in cost-effectiveness studies: The usefulness of the Barthel Index and the EuroQoL-5D.  Quality of Life Research, 15, 220-37           Physical Therapy Evaluation Charge Determination   History Examination Presentation Decision-Making   MEDIUM  Complexity : 1-2 comorbidities / personal factors will impact the outcome/ POC  MEDIUM Complexity : 3 Standardized tests and measures addressing body structure, function, activity limitation and / or participation in recreation  MEDIUM Complexity : Evolving with changing characteristics  MEDIUM Complexity : FOTO score of 26-74      Based on the above components, the patient evaluation is determined to be of the following complexity level: MEDIUM    Pain Rating:  B heels, pt unable to quantify    Activity Tolerance:   Fair  Please refer to the flowsheet for vital signs taken during this treatment. After treatment patient left in no apparent distress:   Sitting in chair, Heels elevated for pressure relief, Call bell within reach, and Caregiver / family present    COMMUNICATION/EDUCATION:   The patients plan of care was discussed with: Occupational Therapist, Registered Nurse, and . Fall prevention education was provided and the patient/caregiver indicated understanding., Patient/family have participated as able in goal setting and plan of care. , and Patient/family agree to work toward stated goals and plan of care.     Thank you for this referral.  Kennedi Henry, PT   Time Calculation: 38 mins

## 2019-12-20 NOTE — PROGRESS NOTES
General Surgery End of Shift Nursing Note    . Shift worked:   7a - 7p   Summary of shift:    uneventful   Issues for physician to address: none     Number times ambulated in hallway past shift: 0    Number of times OOB to chair past shift: 0    Pain Management:  Current medication: none  Patient states pain is manageable on current pain medication: YES    GI:    Current diet:  DIET DIABETIC CONSISTENT CARB Regular  DIET NUTRITIONAL SUPPLEMENTS No; Lunch; Glucerna Shake  DIET ONE TIME MESSAGE    Tolerating current diet: YES  Passing flatus: YES  Last Bowel Movement: today x2   Appearance: copious amounts of hard formed green brown stool through out shift    Respiratory:    Incentive Spirometer at bedside: YES  Patient instructed on use: YES    Patient Safety:    Falls Score: 5  Bed Alarm On? Yes  Sitter?  No    Jacqui Huerta LPN

## 2019-12-20 NOTE — PROGRESS NOTES
Bedside shift change report given to AVA Garza (oncoming nurse) by Leny Nelson RN (offgoing nurse). Report included the following information SBAR, Kardex, Procedure Summary, Intake/Output, MAR, Accordion, Recent Results and Med Rec Status.

## 2019-12-21 ENCOUNTER — APPOINTMENT (OUTPATIENT)
Dept: NON INVASIVE DIAGNOSTICS | Age: 84
DRG: 638 | End: 2019-12-21
Attending: INTERNAL MEDICINE
Payer: MEDICARE

## 2019-12-21 LAB
ANION GAP SERPL CALC-SCNC: 4 MMOL/L (ref 5–15)
AV PEAK GRADIENT: 38.49 MMHG
AV VELOCITY RATIO: 0.53
AV VTI RATIO: 0.6
BACTERIA SPEC CULT: NORMAL
BUN SERPL-MCNC: 21 MG/DL (ref 6–20)
BUN/CREAT SERPL: 32 (ref 12–20)
CALCIUM SERPL-MCNC: 8.2 MG/DL (ref 8.5–10.1)
CHLORIDE SERPL-SCNC: 106 MMOL/L (ref 97–108)
CO2 SERPL-SCNC: 31 MMOL/L (ref 21–32)
CREAT SERPL-MCNC: 0.65 MG/DL (ref 0.55–1.02)
ECHO AO ROOT DIAM: 2.27 CM
ECHO AV AREA PEAK VELOCITY: 1.6 CM2
ECHO AV AREA VTI: 1.9 CM2
ECHO AV MEAN GRADIENT: 6.2 MMHG
ECHO AV MEAN VELOCITY: 1.12 M/S
ECHO AV PEAK GRADIENT: 14.4 MMHG
ECHO AV PEAK VELOCITY: 190.04 CM/S
ECHO AV REGURGITANT PHT: 538.6 CM
ECHO AV VTI: 36.5 CM
ECHO EST RA PRESSURE: 10 MMHG
ECHO LA MAJOR AXIS: 2.97 CM
ECHO LA TO AORTIC ROOT RATIO: 1.31
ECHO LA VOL 4C: 47.08 ML (ref 22–52)
ECHO LA VOLUME INDEX A4C: 29.67 ML/M2 (ref 16–28)
ECHO LV E' LATERAL VELOCITY: 8.75 CM/S
ECHO LV E' SEPTAL VELOCITY: 5.96 CM/S
ECHO LV EDV A4C: 51 ML
ECHO LV EDV INDEX A4C: 32.1 ML/M2
ECHO LV EJECTION FRACTION A4C: 52 %
ECHO LV ESV A4C: 24.6 ML
ECHO LV ESV INDEX A4C: 15.5 ML/M2
ECHO LV INTERNAL DIMENSION DIASTOLIC: 2.58 CM (ref 3.9–5.3)
ECHO LV INTERNAL DIMENSION SYSTOLIC: 1.85 CM
ECHO LV IVSD: 1.26 CM (ref 0.6–0.9)
ECHO LV MASS 2D: 125.4 G (ref 67–162)
ECHO LV MASS INDEX 2D: 79 G/M2 (ref 43–95)
ECHO LV POSTERIOR WALL DIASTOLIC: 1.48 CM (ref 0.6–0.9)
ECHO LVOT CARDIAC OUTPUT: 4.6 L/MIN
ECHO LVOT DIAM: 1.99 CM
ECHO LVOT PEAK GRADIENT: 4 MMHG
ECHO LVOT PEAK VELOCITY: 99.83 CM/S
ECHO LVOT SV: 70.5 ML
ECHO LVOT VTI: 22.63 CM
ECHO MV A VELOCITY: 130.91 CM/S
ECHO MV AREA PHT: 2.7 CM2
ECHO MV AREA VTI: 2.2 CM2
ECHO MV E DECELERATION TIME (DT): 290.8 MS
ECHO MV E VELOCITY: 79.46 CM/S
ECHO MV E/A RATIO: 0.61
ECHO MV E/E' LATERAL: 9.08
ECHO MV E/E' RATIO (AVERAGED): 11.21
ECHO MV E/E' SEPTAL: 13.33
ECHO MV MAX VELOCITY: 132.7 CM/S
ECHO MV MEAN GRADIENT: 1.9 MMHG
ECHO MV MEAN INFLOW VELOCITY: 0.6 M/S
ECHO MV PEAK GRADIENT: 7 MMHG
ECHO MV PRESSURE HALF TIME (PHT): 81.1 MS
ECHO MV VTI: 32.69 CM
ECHO PULMONARY ARTERY SYSTOLIC PRESSURE (PASP): 29.2 MMHG
ECHO PV MAX VELOCITY: 129.06 CM/S
ECHO PV MEAN GRADIENT: 3 MMHG
ECHO PV PEAK GRADIENT: 6.7 MMHG
ECHO PV VTI: 25.05 CM
ECHO RIGHT VENTRICULAR SYSTOLIC PRESSURE (RVSP): 29.2 MMHG
ECHO TV A WAVE: 68.4 CM/S
ECHO TV E WAVE: 53.74 CM/S
ECHO TV EROA: 1.3
ECHO TV REGURGITANT MAX VELOCITY: 219 CM/S
ECHO TV REGURGITANT PEAK GRADIENT: 19.2 MMHG
GLUCOSE BLD STRIP.AUTO-MCNC: 105 MG/DL (ref 65–100)
GLUCOSE BLD STRIP.AUTO-MCNC: 108 MG/DL (ref 65–100)
GLUCOSE BLD STRIP.AUTO-MCNC: 118 MG/DL (ref 65–100)
GLUCOSE BLD STRIP.AUTO-MCNC: 136 MG/DL (ref 65–100)
GLUCOSE SERPL-MCNC: 116 MG/DL (ref 65–100)
LVFS 2D: 28.43 %
LVOT MG: 1.95 MMHG
LVOT MV: 0.65 CM/S
MV DEC SLOPE: 2.73
PISA AR MAX VEL: 310.21 CM/S
POTASSIUM SERPL-SCNC: 4 MMOL/L (ref 3.5–5.1)
SERVICE CMNT-IMP: ABNORMAL
SERVICE CMNT-IMP: NORMAL
SODIUM SERPL-SCNC: 141 MMOL/L (ref 136–145)

## 2019-12-21 PROCEDURE — 74011250637 HC RX REV CODE- 250/637: Performed by: INTERNAL MEDICINE

## 2019-12-21 PROCEDURE — 36415 COLL VENOUS BLD VENIPUNCTURE: CPT

## 2019-12-21 PROCEDURE — 93306 TTE W/DOPPLER COMPLETE: CPT

## 2019-12-21 PROCEDURE — 74011250636 HC RX REV CODE- 250/636: Performed by: INTERNAL MEDICINE

## 2019-12-21 PROCEDURE — 77030038269 HC DRN EXT URIN PURWCK BARD -A

## 2019-12-21 PROCEDURE — 82962 GLUCOSE BLOOD TEST: CPT

## 2019-12-21 PROCEDURE — 65660000000 HC RM CCU STEPDOWN

## 2019-12-21 PROCEDURE — 94760 N-INVAS EAR/PLS OXIMETRY 1: CPT

## 2019-12-21 PROCEDURE — 80048 BASIC METABOLIC PNL TOTAL CA: CPT

## 2019-12-21 RX ORDER — HYDROXYZINE HYDROCHLORIDE 10 MG/1
10 TABLET, FILM COATED ORAL
Status: DISCONTINUED | OUTPATIENT
Start: 2019-12-21 | End: 2019-12-24 | Stop reason: HOSPADM

## 2019-12-21 RX ADMIN — POLYETHYLENE GLYCOL (3350) 17 G: 17 POWDER, FOR SOLUTION ORAL at 08:07

## 2019-12-21 RX ADMIN — HEPARIN SODIUM 5000 UNITS: 5000 INJECTION INTRAVENOUS; SUBCUTANEOUS at 20:57

## 2019-12-21 RX ADMIN — Medication 10 ML: at 18:02

## 2019-12-21 RX ADMIN — DOCUSATE SODIUM 100 MG: 100 CAPSULE, LIQUID FILLED ORAL at 08:06

## 2019-12-21 RX ADMIN — CALCIUM POLYCARBOPHIL 625 MG: 625 TABLET, FILM COATED ORAL at 08:06

## 2019-12-21 RX ADMIN — LORAZEPAM 0.25 MG: 0.5 TABLET ORAL at 18:01

## 2019-12-21 RX ADMIN — ATORVASTATIN CALCIUM 10 MG: 10 TABLET, FILM COATED ORAL at 08:06

## 2019-12-21 RX ADMIN — Medication 10 ML: at 20:57

## 2019-12-21 RX ADMIN — CYANOCOBALAMIN TAB 500 MCG 1000 MCG: 500 TAB at 08:06

## 2019-12-21 RX ADMIN — CEPHALEXIN 500 MG: 250 CAPSULE ORAL at 06:12

## 2019-12-21 RX ADMIN — RISPERIDONE 0.25 MG: 0.25 TABLET ORAL at 20:55

## 2019-12-21 RX ADMIN — ASPIRIN 81 MG: 81 TABLET, COATED ORAL at 08:06

## 2019-12-21 RX ADMIN — LORAZEPAM 0.25 MG: 0.5 TABLET ORAL at 08:07

## 2019-12-21 RX ADMIN — DOXYCYCLINE HYCLATE 100 MG: 100 TABLET, COATED ORAL at 20:55

## 2019-12-21 RX ADMIN — TORSEMIDE 5 MG: 20 TABLET ORAL at 18:00

## 2019-12-21 RX ADMIN — DOXYCYCLINE HYCLATE 100 MG: 100 TABLET, COATED ORAL at 08:06

## 2019-12-21 RX ADMIN — NORTRIPTYLINE HYDROCHLORIDE 75 MG: 25 CAPSULE ORAL at 20:55

## 2019-12-21 RX ADMIN — HEPARIN SODIUM 5000 UNITS: 5000 INJECTION INTRAVENOUS; SUBCUTANEOUS at 08:07

## 2019-12-21 RX ADMIN — DOCUSATE SODIUM 100 MG: 100 CAPSULE, LIQUID FILLED ORAL at 18:01

## 2019-12-21 RX ADMIN — CALCIUM POLYCARBOPHIL 625 MG: 625 TABLET, FILM COATED ORAL at 21:00

## 2019-12-21 RX ADMIN — CEPHALEXIN 500 MG: 250 CAPSULE ORAL at 18:01

## 2019-12-21 RX ADMIN — RALOXIFENE HYDROCHLORIDE 60 MG: 60 TABLET, FILM COATED ORAL at 08:06

## 2019-12-21 RX ADMIN — CEPHALEXIN 500 MG: 250 CAPSULE ORAL at 01:59

## 2019-12-21 RX ADMIN — NORTRIPTYLINE HYDROCHLORIDE 25 MG: 25 CAPSULE ORAL at 20:15

## 2019-12-21 RX ADMIN — LISINOPRIL 40 MG: 40 TABLET ORAL at 08:06

## 2019-12-21 RX ADMIN — CEPHALEXIN 500 MG: 250 CAPSULE ORAL at 12:30

## 2019-12-21 RX ADMIN — Medication 10 ML: at 06:12

## 2019-12-21 RX ADMIN — CALCIUM 500 MG: 500 TABLET ORAL at 08:06

## 2019-12-21 NOTE — PROGRESS NOTES
Changed bilat dressings to lower extremities as ordered, washed with CHG soap, purple moisturizer applied, xeroform, non adherent dressing and wrapped with kerlix. Patient handled procedure well.

## 2019-12-21 NOTE — PROGRESS NOTES
End of shift    Uneventful shift  Pnt did not ambulate during shift  No BM during shift  No complaints of pain  Pnt remained stable  NADN

## 2019-12-21 NOTE — PROGRESS NOTES
Spoke with Dr. Lashonda Mccurdy regarding patients wish to be a DNR. Dr. Lashonda Mccurdy will discuss with patient.

## 2019-12-21 NOTE — PROGRESS NOTES
Patients daughter is patients medical power of  who provided patients living will which states patients resuscitation wished. Per patients daughter, patient wishes to be a DNR, patients daughter made aware that a physician needs to discuss DNR status with patient and a DNR form needs to be signed by patient and physician. Physician will be paged.

## 2019-12-21 NOTE — PROGRESS NOTES
Hospitalist Progress Note    NAME: Madeline Damon   :  1935   MRN:  [id number]       Assessment / Plan:  Bilateral lower extremity cellulitis and LE ulcers, failed outpatient management   in the setting of Diabetes mellitus POA  h/o chronic foot drop with prosthesis POA  -On presentation, there was extensive involvement of skin up to the level of the knees. Based on documentation and patient's daughter's impression, it appears that this has improved some. Persistent erythema noted but question whether there is a vascular component to this.   -Blood Cx negative, final.  -Had been on IV vancomycin and Unasyn; patient remains stable. Transitioned to Keflex and doxycycline in absence of an isolated organism.  -Wound care consult reviewed and appreciated  -ABIs/TBIs reviewed and vascular surgery consult obtained. Appreciate input. Appears outpatient follow up likely.   -Leg elevation  -Recent ultrasound shows no evidence of DVT  -She may need a different orthotic device since that is contributing to her wounds per patient's daughter.      Diabetes mellitus type 2 POA- controlled  -Improved glycemic control with insulin-sensitive corrective insulin protocol  -HgbA1c 5.6%! Not in diabetic range at this time.  -Holding home metformin    Abdominal distention/abnormal exam  -Obtained abdominal US to further assess -- no acute findings  -Constipation improved with MOM    Hypertension  Dyslipidemia  Depression  Edema of both legs  Cont home lisinopril  Cont home statin  Cont home torsemide. Minimal ongoing edema at this time. Unclear if patient has CHF -- few records available. Echo pending.      Code Status full code  Surrogate Decision Maker: Daughter Merlyn Polk     DVT Prophylaxis: Heparin        Baseline: From home, independent, from [place]. Discussed with patient and daughter that independent living does not look like a realistic safe option moving forward.      Recommended Disposition: Home w/Family and HH PT, OT, RN? TBD. Hopefully home or SNF on Monday. Subjective:     Chief Complaint / Reason for Physician Visit :F/U B/L LE cellulitis, DM  States she feels pretty well today. Thinks her legs look better. Had a BM and feels relieved with that. Review of Systems:  Symptom Y/N Comments  Symptom Y/N Comments   Fever/Chills n   Chest Pain n    Poor Appetite n   Edema n    Cough n   Abdominal Pain n    Sputum    Joint Pain     SOB/BERNAL n   Pruritis/Rash y B/L LE redness and flaking   Nausea/vomit    Tolerating PT/OT y    Diarrhea    Tolerating Diet y    Constipation n   Other       Could NOT obtain due to:      Objective:     VITALS:   Last 24hrs VS reviewed since prior progress note. Most recent are:  Patient Vitals for the past 24 hrs:   Temp Pulse Resp BP SpO2   12/21/19 1056 97.6 °F (36.4 °C) 68 17 144/53 100 %   12/21/19 0925    145/58    12/21/19 0744 97.7 °F (36.5 °C) 66 17 143/58 100 %   12/21/19 0317 98.7 °F (37.1 °C) 73 16 136/44 98 %   12/20/19 2341 98.3 °F (36.8 °C) 71 16 122/42 98 %   12/20/19 1930 97.4 °F (36.3 °C) 64 16 138/48 100 %   12/20/19 1516 97.4 °F (36.3 °C) 62 16 128/41 100 %       Intake/Output Summary (Last 24 hours) at 12/21/2019 1321  Last data filed at 12/21/2019 0800  Gross per 24 hour   Intake 130 ml   Output 1050 ml   Net -920 ml        PHYSICAL EXAM:  General: WD, WN. Alert, cooperative, no acute distress    EENT:  EOMI. Anicteric sclerae. MMM  Resp:  CTA bilaterally, no wheezing or rales. No accessory muscle use  CV:  Regular  rhythm,  No edema  GI:  Soft, distended, Non tender.  +Bowel sounds  Neurologic:  Alert, normal speech, poor short term memory  Psych:   Limited insight and judgment. Not anxious nor agitated  Skin:  No rashes. No jaundice, minimal bilateral lower extremity redness and warmth, trace edema only, persistent RLE ulcer noted. Less skin flaking.     Reviewed most current lab test results and cultures  YES  Reviewed most current radiology test results YES  Review and summation of old records today    NO  Reviewed patient's current orders and MAR    YES  PMH/SH reviewed - no change compared to H&P  ________________________________________________________________________  Care Plan discussed with:    Comments   Patient x    Family      RN     Care Manager     Consultant                        Multidiciplinary team rounds were held today with , nursing, pharmacist and clinical coordinator. Patient's plan of care was discussed; medications were reviewed and discharge planning was addressed. ________________________________________________________________________  Total NON critical care TIME:  25   Minutes    Total CRITICAL CARE TIME Spent:   Minutes non procedure based      Comments   >50% of visit spent in counseling and coordination of care     ________________________________________________________________________  Sally Kelsey MD     Procedures: see electronic medical records for all procedures/Xrays and details which were not copied into this note but were reviewed prior to creation of Plan. LABS:  I reviewed today's most current labs and imaging studies. Pertinent labs include:  No results for input(s): WBC, HGB, HCT, PLT, HGBEXT, HCTEXT, PLTEXT, HGBEXT, HCTEXT, PLTEXT in the last 72 hours.   Recent Labs     12/21/19  0449 12/20/19  0412 12/19/19  0133    137 139   K 4.0 3.5 3.7    102 102   CO2 31 31 31   * 102* 118*   BUN 21* 20 22*   CREA 0.65 0.69 0.84   CA 8.2* 8.0* 8.3*       Signed: Sally Kelsey MD

## 2019-12-21 NOTE — PROGRESS NOTES
Problem: Falls - Risk of  Goal: *Absence of Falls  Description  Document Jeronimo Lemon Fall Risk and appropriate interventions in the flowsheet.   Outcome: Progressing Towards Goal  Note: Fall Risk Interventions:  Mobility Interventions: Patient to call before getting OOB    Mentation Interventions: Bed/chair exit alarm    Medication Interventions: Assess postural VS orthostatic hypotension    Elimination Interventions: Bed/chair exit alarm, Call light in reach    History of Falls Interventions: Consult care management for discharge planning         Problem: Patient Education: Go to Patient Education Activity  Goal: Patient/Family Education  Outcome: Progressing Towards Goal     Problem: Patient Education: Go to Patient Education Activity  Goal: Patient/Family Education  Outcome: Progressing Towards Goal

## 2019-12-22 LAB
ANION GAP SERPL CALC-SCNC: 3 MMOL/L (ref 5–15)
BUN SERPL-MCNC: 23 MG/DL (ref 6–20)
BUN/CREAT SERPL: 35 (ref 12–20)
CALCIUM SERPL-MCNC: 8.3 MG/DL (ref 8.5–10.1)
CHLORIDE SERPL-SCNC: 104 MMOL/L (ref 97–108)
CO2 SERPL-SCNC: 31 MMOL/L (ref 21–32)
CREAT SERPL-MCNC: 0.66 MG/DL (ref 0.55–1.02)
GLUCOSE BLD STRIP.AUTO-MCNC: 103 MG/DL (ref 65–100)
GLUCOSE BLD STRIP.AUTO-MCNC: 115 MG/DL (ref 65–100)
GLUCOSE BLD STRIP.AUTO-MCNC: 136 MG/DL (ref 65–100)
GLUCOSE BLD STRIP.AUTO-MCNC: 170 MG/DL (ref 65–100)
GLUCOSE SERPL-MCNC: 112 MG/DL (ref 65–100)
POTASSIUM SERPL-SCNC: 4.2 MMOL/L (ref 3.5–5.1)
SERVICE CMNT-IMP: ABNORMAL
SODIUM SERPL-SCNC: 138 MMOL/L (ref 136–145)

## 2019-12-22 PROCEDURE — 94760 N-INVAS EAR/PLS OXIMETRY 1: CPT

## 2019-12-22 PROCEDURE — 82962 GLUCOSE BLOOD TEST: CPT

## 2019-12-22 PROCEDURE — 74011250637 HC RX REV CODE- 250/637: Performed by: INTERNAL MEDICINE

## 2019-12-22 PROCEDURE — 74011250636 HC RX REV CODE- 250/636: Performed by: INTERNAL MEDICINE

## 2019-12-22 PROCEDURE — 65660000000 HC RM CCU STEPDOWN

## 2019-12-22 PROCEDURE — 80048 BASIC METABOLIC PNL TOTAL CA: CPT

## 2019-12-22 PROCEDURE — 36415 COLL VENOUS BLD VENIPUNCTURE: CPT

## 2019-12-22 RX ADMIN — Medication 10 ML: at 13:17

## 2019-12-22 RX ADMIN — CEPHALEXIN 500 MG: 250 CAPSULE ORAL at 11:35

## 2019-12-22 RX ADMIN — DOCUSATE SODIUM 100 MG: 100 CAPSULE, LIQUID FILLED ORAL at 17:07

## 2019-12-22 RX ADMIN — Medication 10 ML: at 21:26

## 2019-12-22 RX ADMIN — NORTRIPTYLINE HYDROCHLORIDE 25 MG: 25 CAPSULE ORAL at 18:28

## 2019-12-22 RX ADMIN — LORAZEPAM 0.25 MG: 0.5 TABLET ORAL at 08:59

## 2019-12-22 RX ADMIN — RALOXIFENE HYDROCHLORIDE 60 MG: 60 TABLET, FILM COATED ORAL at 08:59

## 2019-12-22 RX ADMIN — NORTRIPTYLINE HYDROCHLORIDE 75 MG: 25 CAPSULE ORAL at 21:26

## 2019-12-22 RX ADMIN — LORAZEPAM 0.25 MG: 0.5 TABLET ORAL at 17:07

## 2019-12-22 RX ADMIN — CALCIUM POLYCARBOPHIL 625 MG: 625 TABLET, FILM COATED ORAL at 08:59

## 2019-12-22 RX ADMIN — Medication 10 ML: at 06:20

## 2019-12-22 RX ADMIN — CALCIUM POLYCARBOPHIL 625 MG: 625 TABLET, FILM COATED ORAL at 22:17

## 2019-12-22 RX ADMIN — DOXYCYCLINE HYCLATE 100 MG: 100 TABLET, COATED ORAL at 08:59

## 2019-12-22 RX ADMIN — LISINOPRIL 40 MG: 40 TABLET ORAL at 09:00

## 2019-12-22 RX ADMIN — HEPARIN SODIUM 5000 UNITS: 5000 INJECTION INTRAVENOUS; SUBCUTANEOUS at 09:00

## 2019-12-22 RX ADMIN — CEPHALEXIN 500 MG: 250 CAPSULE ORAL at 01:00

## 2019-12-22 RX ADMIN — CYANOCOBALAMIN TAB 500 MCG 1000 MCG: 500 TAB at 08:59

## 2019-12-22 RX ADMIN — CALCIUM 500 MG: 500 TABLET ORAL at 08:59

## 2019-12-22 RX ADMIN — DOCUSATE SODIUM 100 MG: 100 CAPSULE, LIQUID FILLED ORAL at 09:00

## 2019-12-22 RX ADMIN — ATORVASTATIN CALCIUM 10 MG: 10 TABLET, FILM COATED ORAL at 08:59

## 2019-12-22 RX ADMIN — CEPHALEXIN 500 MG: 250 CAPSULE ORAL at 05:41

## 2019-12-22 RX ADMIN — POLYETHYLENE GLYCOL (3350) 17 G: 17 POWDER, FOR SOLUTION ORAL at 08:59

## 2019-12-22 RX ADMIN — HEPARIN SODIUM 5000 UNITS: 5000 INJECTION INTRAVENOUS; SUBCUTANEOUS at 20:14

## 2019-12-22 RX ADMIN — TORSEMIDE 5 MG: 20 TABLET ORAL at 17:07

## 2019-12-22 NOTE — PROGRESS NOTES
End of shift    Uneventful shift  No BM noted  Pnt did not ambulate during shift  No complaints of pain voiced during shift  Dsgs to LEs bilat clean dry and intact  Pnt remained stable  NADN

## 2019-12-22 NOTE — PROGRESS NOTES
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 Based on documentation and patient's daughter's impression, it appears that this has improved. Clinically, cellulitis is resolved. Persistent erythema noted but likely has vascular component to this.   -Blood Cx negative, final.  -Had been on IV vancomycin and Unasyn; patient remains stable. Transitioned to Keflex and doxycycline in absence of an isolated organism. Complete antibiotics today.  -Wound care consult reviewed and appreciated  -ABIs/TBIs reviewed and vascular surgery consult obtained. Appreciate input. Appears outpatient follow up likely.   -Leg elevation  -Recent ultrasound shows no evidence of DVT  -She may need a different orthotic device since that is contributing to her wounds per patient's daughter.      Diabetes mellitus type 2 POA- controlled  -Improved glycemic control with insulin-sensitive corrective insulin protocol  -HgbA1c 5.6%! Not in diabetic range at this time.  -Holding home metformin    Abdominal distention/abnormal exam  -Obtained abdominal US to further assess -- no acute findings  -Constipation improved with MOM    Hypertension  Dyslipidemia  Depression  Edema of both legs  Cont home lisinopril  Cont home statin  Cont home torsemide. Minimal ongoing edema at this time. Echocardiogram did not disclose systolic or diastolic dysfunction or valvular disease.      Code Status: DNR  Surrogate Decision Maker: Daughter Deanna Sage     DVT Prophylaxis: Heparin        Baseline: From home, independent, from [place].  Discussed with patient and daughter that independent living does not look like a realistic safe option moving forward. Recommended Disposition: SNF/LTC and  PT, OT, RN Discussed options with daughter, her concerns are financial coverage. Medically stable for discharge tomorrow. Encouraged her to talk with CM. Subjective:     Chief Complaint / Reason for Physician Visit :F/U B/L LE cellulitis, DM  No new complaints today. Not itching so much today. Eating well. No vomiting or diarrhea. Review of Systems:  Symptom Y/N Comments  Symptom Y/N Comments   Fever/Chills n   Chest Pain n    Poor Appetite n   Edema n    Cough n   Abdominal Pain n    Sputum    Joint Pain     SOB/BERNAL n   Pruritis/Rash y B/L LE redness and flaking, improving   Nausea/vomit    Tolerating PT/OT y    Diarrhea    Tolerating Diet y    Constipation n   Other       Could NOT obtain due to:      Objective:     VITALS:   Last 24hrs VS reviewed since prior progress note. Most recent are:  Patient Vitals for the past 24 hrs:   Temp Pulse Resp BP SpO2   12/22/19 1137 98.2 °F (36.8 °C) 68 16 151/48 97 %   12/22/19 0545 97.3 °F (36.3 °C) 73 18 138/55 100 %   12/21/19 1947 97.7 °F (36.5 °C) 70 18 (!) 156/38 100 %   12/21/19 1612 97.5 °F (36.4 °C) 67 17 155/53 100 %       Intake/Output Summary (Last 24 hours) at 12/22/2019 1437  Last data filed at 12/22/2019 1214  Gross per 24 hour   Intake 640 ml   Output 1400 ml   Net -760 ml        PHYSICAL EXAM:  General: WD, WN. Alert, cooperative, no acute distress. Very thin. EENT:  EOMI. Anicteric sclerae. MMM  Resp:  CTA bilaterally, no wheezing or rales. No accessory muscle use  CV:  Regular  rhythm,  No edema  GI:  Soft, distended, Non tender.  +Bowel sounds  Neurologic:  Alert, normal speech, poor short term memory  Psych:   Limited insight and judgment. Not anxious nor agitated  Skin:  No rashes. No jaundice, minimal bilateral lower extremity redness, no abnormal warmth, trace edema only. Less skin flaking.     Reviewed most current lab test results and cultures  YES  Reviewed most current radiology test results   YES  Review and summation of old records today    NO  Reviewed patient's current orders and MAR    YES  PMH/SH reviewed - no change compared to H&P  ________________________________________________________________________  Care Plan discussed with:    Comments   Patient x    Family  x    RN     Care Manager     Consultant                        Multidiciplinary team rounds were held today with , nursing, pharmacist and clinical coordinator. Patient's plan of care was discussed; medications were reviewed and discharge planning was addressed. ________________________________________________________________________  Total NON critical care TIME:  25   Minutes    Total CRITICAL CARE TIME Spent:   Minutes non procedure based      Comments   >50% of visit spent in counseling and coordination of care     ________________________________________________________________________  Mari Ngo MD     Procedures: see electronic medical records for all procedures/Xrays and details which were not copied into this note but were reviewed prior to creation of Plan. LABS:  I reviewed today's most current labs and imaging studies. Pertinent labs include:  No results for input(s): WBC, HGB, HCT, PLT, HGBEXT, HCTEXT, PLTEXT, HGBEXT, HCTEXT, PLTEXT in the last 72 hours.   Recent Labs     12/22/19  0300 12/21/19  0449 12/20/19  0412    141 137   K 4.2 4.0 3.5    106 102   CO2 31 31 31   * 116* 102*   BUN 23* 21* 20   CREA 0.66 0.65 0.69   CA 8.3* 8.2* 8.0*       Signed: Mari Ngo MD

## 2019-12-22 NOTE — PROGRESS NOTES
General Surgery End of Shift Nursing Note    Bedside shift change report given to Toshia Fajardo (oncoming nurse) by Amanda Cantu (offgoing nurse). Report included the following information SBAR, Kardex, Procedure Summary, Intake/Output, MAR and Recent Results. Shift worked:   7a-7p   Summary of shift:   No complaints of pain this shift. Dressings changed per order. Pt voiding appropriately. Pt with good appetite and tolerating diet. Pt had one bowel movement this shift. Issues for physician to address:        Number times ambulated in hallway past shift: 0    Number of times OOB to chair past shift: 0        GI:    Current diet:  DIET NUTRITIONAL SUPPLEMENTS No; Lunch; Glucerna Shake  DIET ONE TIME MESSAGE  DIET REGULAR No Conc.  Sweets    Tolerating current diet: YES  Passing flatus: YES  Last Bowel Movement: today   Appearance: soft brown    Brendia Antonio

## 2019-12-23 LAB
BASOPHILS # BLD: 0 K/UL (ref 0–0.1)
BASOPHILS NFR BLD: 0 % (ref 0–1)
DIFFERENTIAL METHOD BLD: ABNORMAL
EOSINOPHIL # BLD: 1.9 K/UL (ref 0–0.4)
EOSINOPHIL NFR BLD: 17 % (ref 0–7)
ERYTHROCYTE [DISTWIDTH] IN BLOOD BY AUTOMATED COUNT: 12.7 % (ref 11.5–14.5)
GLUCOSE BLD STRIP.AUTO-MCNC: 123 MG/DL (ref 65–100)
GLUCOSE BLD STRIP.AUTO-MCNC: 132 MG/DL (ref 65–100)
GLUCOSE BLD STRIP.AUTO-MCNC: 153 MG/DL (ref 65–100)
GLUCOSE BLD STRIP.AUTO-MCNC: 169 MG/DL (ref 65–100)
HCT VFR BLD AUTO: 36 % (ref 35–47)
HGB BLD-MCNC: 11.6 G/DL (ref 11.5–16)
IMM GRANULOCYTES # BLD AUTO: 0 K/UL (ref 0–0.04)
IMM GRANULOCYTES NFR BLD AUTO: 0 % (ref 0–0.5)
LYMPHOCYTES # BLD: 2.2 K/UL (ref 0.8–3.5)
LYMPHOCYTES NFR BLD: 20 % (ref 12–49)
MCH RBC QN AUTO: 31.7 PG (ref 26–34)
MCHC RBC AUTO-ENTMCNC: 32.2 G/DL (ref 30–36.5)
MCV RBC AUTO: 98.4 FL (ref 80–99)
MONOCYTES # BLD: 1.2 K/UL (ref 0–1)
MONOCYTES NFR BLD: 11 % (ref 5–13)
NEUTS SEG # BLD: 5.6 K/UL (ref 1.8–8)
NEUTS SEG NFR BLD: 52 % (ref 32–75)
NRBC # BLD: 0 K/UL (ref 0–0.01)
NRBC BLD-RTO: 0 PER 100 WBC
PLATELET # BLD AUTO: 311 K/UL (ref 150–400)
PMV BLD AUTO: 9.3 FL (ref 8.9–12.9)
RBC # BLD AUTO: 3.66 M/UL (ref 3.8–5.2)
RBC MORPH BLD: ABNORMAL
SERVICE CMNT-IMP: ABNORMAL
WBC # BLD AUTO: 10.9 K/UL (ref 3.6–11)

## 2019-12-23 PROCEDURE — 36415 COLL VENOUS BLD VENIPUNCTURE: CPT

## 2019-12-23 PROCEDURE — 97116 GAIT TRAINING THERAPY: CPT

## 2019-12-23 PROCEDURE — 74011250637 HC RX REV CODE- 250/637: Performed by: INTERNAL MEDICINE

## 2019-12-23 PROCEDURE — 97530 THERAPEUTIC ACTIVITIES: CPT

## 2019-12-23 PROCEDURE — 82962 GLUCOSE BLOOD TEST: CPT

## 2019-12-23 PROCEDURE — 97110 THERAPEUTIC EXERCISES: CPT

## 2019-12-23 PROCEDURE — 65660000000 HC RM CCU STEPDOWN

## 2019-12-23 PROCEDURE — 74011250636 HC RX REV CODE- 250/636: Performed by: INTERNAL MEDICINE

## 2019-12-23 PROCEDURE — 94760 N-INVAS EAR/PLS OXIMETRY 1: CPT

## 2019-12-23 PROCEDURE — 85025 COMPLETE CBC W/AUTO DIFF WBC: CPT

## 2019-12-23 RX ADMIN — ASPIRIN 81 MG: 81 TABLET, COATED ORAL at 08:31

## 2019-12-23 RX ADMIN — CYANOCOBALAMIN TAB 500 MCG 1000 MCG: 500 TAB at 08:30

## 2019-12-23 RX ADMIN — RISPERIDONE 0.25 MG: 0.25 TABLET ORAL at 21:47

## 2019-12-23 RX ADMIN — HEPARIN SODIUM 5000 UNITS: 5000 INJECTION INTRAVENOUS; SUBCUTANEOUS at 21:48

## 2019-12-23 RX ADMIN — Medication 10 ML: at 13:18

## 2019-12-23 RX ADMIN — DOCUSATE SODIUM 100 MG: 100 CAPSULE, LIQUID FILLED ORAL at 08:31

## 2019-12-23 RX ADMIN — LORAZEPAM 0.25 MG: 0.5 TABLET ORAL at 17:30

## 2019-12-23 RX ADMIN — RALOXIFENE HYDROCHLORIDE 60 MG: 60 TABLET, FILM COATED ORAL at 08:42

## 2019-12-23 RX ADMIN — CALCIUM POLYCARBOPHIL 625 MG: 625 TABLET, FILM COATED ORAL at 08:42

## 2019-12-23 RX ADMIN — NORTRIPTYLINE HYDROCHLORIDE 25 MG: 25 CAPSULE ORAL at 18:24

## 2019-12-23 RX ADMIN — LORAZEPAM 0.25 MG: 0.5 TABLET ORAL at 08:31

## 2019-12-23 RX ADMIN — CALCIUM 500 MG: 500 TABLET ORAL at 08:31

## 2019-12-23 RX ADMIN — DOCUSATE SODIUM 100 MG: 100 CAPSULE, LIQUID FILLED ORAL at 17:31

## 2019-12-23 RX ADMIN — TORSEMIDE 5 MG: 20 TABLET ORAL at 17:30

## 2019-12-23 RX ADMIN — Medication 10 ML: at 21:48

## 2019-12-23 RX ADMIN — ATORVASTATIN CALCIUM 10 MG: 10 TABLET, FILM COATED ORAL at 08:31

## 2019-12-23 RX ADMIN — HEPARIN SODIUM 5000 UNITS: 5000 INJECTION INTRAVENOUS; SUBCUTANEOUS at 08:31

## 2019-12-23 RX ADMIN — POLYETHYLENE GLYCOL (3350) 17 G: 17 POWDER, FOR SOLUTION ORAL at 08:31

## 2019-12-23 RX ADMIN — Medication 10 ML: at 05:18

## 2019-12-23 RX ADMIN — CALCIUM POLYCARBOPHIL 625 MG: 625 TABLET, FILM COATED ORAL at 22:25

## 2019-12-23 RX ADMIN — LISINOPRIL 40 MG: 40 TABLET ORAL at 08:31

## 2019-12-23 RX ADMIN — NORTRIPTYLINE HYDROCHLORIDE 75 MG: 25 CAPSULE ORAL at 21:47

## 2019-12-23 NOTE — PROGRESS NOTES
Problem: Falls - Risk of  Goal: *Absence of Falls  Description  Document Benrardino [place]anna Fall Risk and appropriate interventions in the flowsheet. Outcome: Progressing Towards Goal  Note: Fall Risk Interventions:  Mobility Interventions: Patient to call before getting OOB    Mentation Interventions: Door open when patient unattended    Medication Interventions: Patient to call before getting OOB    Elimination Interventions: Patient to call for help with toileting needs    History of Falls Interventions: Evaluate medications/consider consulting pharmacy, Investigate reason for fall, Room close to nurse's station         Problem: Patient Education: Go to Patient Education Activity  Goal: Patient/Family Education  Outcome: Progressing Towards Goal     Problem: Patient Education: Go to Patient Education Activity  Goal: Patient/Family Education  Outcome: Progressing Towards Goal     Problem: Pressure Injury - Risk of  Goal: *Prevention of pressure injury  Description  Document Sumeet Scale and appropriate interventions in the flowsheet.   Outcome: Progressing Towards Goal  Note: Pressure Injury Interventions:  Sensory Interventions: Keep linens dry and wrinkle-free    Moisture Interventions: Internal/External urinary devices    Activity Interventions: Increase time out of bed, Pressure redistribution bed/mattress(bed type)    Mobility Interventions: HOB 30 degrees or less, Pressure redistribution bed/mattress (bed type)    Nutrition Interventions: Document food/fluid/supplement intake    Friction and Shear Interventions: HOB 30 degrees or less                Problem: Patient Education: Go to Patient Education Activity  Goal: Patient/Family Education  Outcome: Progressing Towards Goal

## 2019-12-23 NOTE — PROGRESS NOTES
Vascular Surgery Progress Note  [facility]  12[facility]2019       Subjective:     Judith Knox is a 81 y.o.  female w/ a pmhx significant for HTN, HLD, and DM. She was on insulin for many years until an intentional weight loss of 160 lbs. It is now controlled w/ oral agents. She has never smoked. She denies a hx of CAD or CVA. She has diabetic neuropathy and chronic bilateral \"toe drop\" that she wears bilateral leg braces for that rest on her shins. She routinely lives in [facility] in a second floor home w/ 14 stairs to enter. She routine is independent of her ADLs and rides a tricycle daily. Her dtr flys from her home in [place] [address] to [facility] to check on her mother and manage her medications every 5-7 days. On her most recent trip she noted her mother's legs were sore and edematous with open wounds that were making it difficult for her to ambulate. She started using a cane. In the last few months she has also noticed intermittent periods of memory loss and at times her mother will not answer her phone. On one occasion it prompted a welfare check from her dtr who flew down the same day to check on her. On this last visit her condition was so poor she brought her back to [place] for evaluation. She was unable to see her PCP due to multiple patient's in the office w/ the flu. She saw her endocrinologist who directed her to the [address] [place] [address]. They were unable to see her for > 5 days therefore the dtr brought the patient to the emergency room for evaluation. She was diagnosed w/ BLE cellulitis and referred for admission. She had ABIs which were normal bilateral.  Her TBIs were slightly reduced at 0.40 bilaterally and she does not have palpable DP pulses bilaterally. No acute changes over the weekend.       Nursing Data:     Patient Vitals for the past 24 hrs:   BP Temp Pulse Resp SpO2   12/23/19 0737 129/51 97.4 °F (36.3 °C) 60 16 100 %   12/23/19 0259 117/50 98.2 °F (36.8 °C) 69 18 99 %   12/22/19 2244 121/62 98.7 °F (37.1 °C) 75 17 98 %   12/22/19 1935 133/62 97.5 °F (36.4 °C) 62 16 99 %   12/22/19 1552 139/52 97.5 °F (36.4 °C) 64 16 100 %   12/22/19 1137 151/48 98.2 °F (36.8 °C) 68 16 97 %     ---------------------------------------------------------------------------------------------------------    Intake/Output Summary (Last 24 hours) at 12/23/2019 1009  Last data filed at 12/23/2019 0836  Gross per 24 hour   Intake 720 ml   Output 1550 ml   Net -830 ml       Exam:     Physical Exam  Constitutional:       Comments: Thin   HENT:      Head: Normocephalic. Nose: Nose normal.      Mouth/Throat:      Mouth: Mucous membranes are moist.   Eyes:      Pupils: Pupils are equal, round, and reactive to light. Neck:      Musculoskeletal: Normal range of motion. Cardiovascular:      Rate and Rhythm: Normal rate and regular rhythm. Pulmonary:      Effort: Pulmonary effort is normal.      Breath sounds: Normal breath sounds. Abdominal:      General: Abdomen is flat. Palpations: Abdomen is soft. Musculoskeletal: Normal range of motion. Skin:     Comments: Multiple open ulcers to the bilateral lower shins and ulceration to the the right second toe. Sloughing of the skin of the bilateral toes. Neurological:      General: No focal deficit present. Mental Status: She is alert. Comments: Oriented to name and place. She follows commands. Psychiatric:         Mood and Affect: Mood normal.         Speech: Speech normal.         Behavior: Behavior normal.         Cognition and Memory: Memory is impaired.         Lab Review:     .   Recent Results (from the past 24 hour(s))   GLUCOSE, POC    Collection Time: 12/22/19 11:18 AM   Result Value Ref Range    Glucose (POC) 170 (H) 65 - 100 mg/dL    Performed by Judy Ceja LPN    GLUCOSE, POC    Collection Time: 12/22/19  4:21 PM   Result Value Ref Range    Glucose (POC) 136 (H) 65 - 100 mg/dL Performed by Chaim Hobbs LPN    GLUCOSE, POC    Collection Time: 12/22/19  9:06 PM   Result Value Ref Range    Glucose (POC) 103 (H) 65 - 100 mg/dL    Performed by Angeles Wilson LPN    CBC WITH AUTOMATED DIFF    Collection Time: 12/23/19  3:02 AM   Result Value Ref Range    WBC 10.9 3.6 - 11.0 K/uL    RBC 3.66 (L) 3.80 - 5.20 M/uL    HGB 11.6 11.5 - 16.0 g/dL    HCT 36.0 35.0 - 47.0 %    MCV 98.4 80.0 - 99.0 FL    MCH 31.7 26.0 - 34.0 PG    MCHC 32.2 30.0 - 36.5 g/dL    RDW 12.7 11.5 - 14.5 %    PLATELET 744 851 - 591 K/uL    MPV 9.3 8.9 - 12.9 FL    NRBC 0.0 0  WBC    ABSOLUTE NRBC 0.00 0.00 - 0.01 K/uL    NEUTROPHILS 52 32 - 75 %    LYMPHOCYTES 20 12 - 49 %    MONOCYTES 11 5 - 13 %    EOSINOPHILS 17 (H) 0 - 7 %    BASOPHILS 0 0 - 1 %    IMMATURE GRANULOCYTES 0 0.0 - 0.5 %    ABS. NEUTROPHILS 5.6 1.8 - 8.0 K/UL    ABS. LYMPHOCYTES 2.2 0.8 - 3.5 K/UL    ABS. MONOCYTES 1.2 (H) 0.0 - 1.0 K/UL    ABS. EOSINOPHILS 1.9 (H) 0.0 - 0.4 K/UL    ABS. BASOPHILS 0.0 0.0 - 0.1 K/UL    ABS. IMM. GRANS. 0.0 0.00 - 0.04 K/UL    DF MANUAL      RBC COMMENTS NORMOCYTIC, NORMOCHROMIC     GLUCOSE, POC    Collection Time: 12/23/19  6:38 AM   Result Value Ref Range    Glucose (POC) 123 (H) 65 - 100 mg/dL    Performed by Angeles Wilson LPN           Assessment/Plan:      Consult problem:  BLE cellulitis w/ ulceration   -possible lymphedema and/or venous insufficiency  -exacerbated by leg brace use for chronic toe drop  -underlying diabetic neuropathy   -failed tx w/ diurectic      Distal large vessel occlusive disease vs microvascular disease exacerbated by BLE edema. We will plan for an outpatient BLE arteriogram followed by a BLE venous duplex w/ reflux. We will contact patient's dtr w/ f/u. Agree w/ ASA and statin.   Antibx per primary team.  Wound care per wound care team.       Active problems  Concentric hypertrophy   -with preserved EF  Diabetes Mellitus   -controlled  -HA1c 5.6  Hyponatremia  -resolved Hypertension   -stable on ACE  Hyperlipidemia   -LDL 51 on statin   Macrocytic Anemia   Anxiety  -w/ benzodiazepine and antipsychotic dependence   Unsteady gait  -w/ toe drop  Memory loss  -patient may benefit from outpatient neurology evaluation   Constipation  -resolved   Management of comorbid conditions by primary team.     VTE Prophylaxis:  Replaced by [facility]     Disposition:  SNF    Vascular surgery signing off. We appreciate the opportunity to participate in the care of Ms. Goodman. We will contact patient/family w/ f/u w/ Dr. Aida Lira.

## 2019-12-23 NOTE — PROGRESS NOTES
Hospitalist Progress Note    NAME: Luke Lacy   :  1935   MRN:  [id number]       Assessment / Plan:  Bilateral lower extremity cellulitis and LE ulcers, failed outpatient management   in the setting of Diabetes mellitus POA  h/o chronic foot drop with prosthesis POA  -On presentation, there was extensive involvement of skin up to the level of the knees. Based on documentation and patient's daughter's impression, it appears that this has improved. Clinically, cellulitis is resolved. Persistent erythema noted but likely has vascular component to this.   -Blood Cx negative, final.  -Had been on IV vancomycin and Unasyn; patient remains stable. Transitioned to Keflex and doxycycline in absence of an isolated organism. Complete antibiotics today.  -Wound care consult reviewed and appreciated  -ABIs/TBIs reviewed and vascular surgery consult obtained. Appreciate input: 'We will plan for an outpatient BLE arteriogram followed by a BLE venous duplex w/ reflux. We will contact patient's dtr w/ f/u. Agree w/ ASA and statin. \"  -Leg elevation  -Recent ultrasound shows no evidence of DVT  -She may long-term need a different orthotic device since that is contributing to her wounds per patient's daughter.      Diabetes mellitus type 2 POA- controlled  -Improved glycemic control with insulin-sensitive corrective insulin protocol  -HgbA1c 5.6%! Not in diabetic range at this time.  -Holding home metformin    Abdominal distention/abnormal exam  -Obtained abdominal US to further assess -- no acute findings  -Constipation improved with MOM    Hypertension  Dyslipidemia  Depression  Edema of both legs  Cont home lisinopril  Cont home statin  Cont home torsemide. Minimal ongoing edema at this time. Echocardiogram did not disclose systolic or diastolic dysfunction or valvular disease.  Mild concentric hypertrophy.      Code Status: DNR  Surrogate Decision Maker: Daughter Nancy Medrano     DVT Prophylaxis: Heparin        Baseline: From home, independent, from [place]. Discussed with patient and daughter that independent living does not look like a realistic safe option moving forward. Recommended Disposition: SNF/LTC Plan for discharge to [facility] in AM.     Subjective:     Chief Complaint / Reason for Physician Visit :F/U B/L LE cellulitis, DM  No new complaints today. Not itching so much today. Eating well. No vomiting or diarrhea. Review of Systems:  Symptom Y/N Comments  Symptom Y/N Comments   Fever/Chills n   Chest Pain n    Poor Appetite n   Edema n    Cough n   Abdominal Pain n    Sputum    Joint Pain     SOB/BERNAL n   Pruritis/Rash y B/L LE redness and flaking, improving   Nausea/vomit    Tolerating PT/OT y    Diarrhea    Tolerating Diet y    Constipation n   Other       Could NOT obtain due to:      Objective:     VITALS:   Last 24hrs VS reviewed since prior progress note. Most recent are:  Patient Vitals for the past 24 hrs:   Temp Pulse Resp BP SpO2   12/23/19 1145 97.5 °F (36.4 °C) (!) 59 16 144/53 94 %   12/23/19 0737 97.4 °F (36.3 °C) 60 16 129/51 100 %   12/23/19 0259 98.2 °F (36.8 °C) 69 18 117/50 99 %   12/22/19 2244 98.7 °F (37.1 °C) 75 17 121/62 98 %   12/22/19 1935 97.5 °F (36.4 °C) 62 16 133/62 99 %   12/22/19 1552 97.5 °F (36.4 °C) 64 16 139/52 100 %       Intake/Output Summary (Last 24 hours) at 12/23/2019 1508  Last data filed at 12/23/2019 0836  Gross per 24 hour   Intake 480 ml   Output 1550 ml   Net -1070 ml        PHYSICAL EXAM:  General: WD, WN. Alert, cooperative, no acute distress. Very thin. EENT:  EOMI. Anicteric sclerae. MMM  Resp:  CTA bilaterally, no wheezing or rales. No accessory muscle use  CV:  Regular  rhythm,  No edema  GI:  Soft, distended, Non tender.  +Bowel sounds  Neurologic:  Alert, normal speech, poor short term memory  Psych:   Limited insight and judgment. Not anxious nor agitated  Skin:  No rashes.   No jaundice, minimal bilateral lower extremity redness, no abnormal warmth, trace edema only. Less skin flaking. Reviewed most current lab test results and cultures  YES  Reviewed most current radiology test results   YES  Review and summation of old records today    NO  Reviewed patient's current orders and MAR    YES  PMH/SH reviewed - no change compared to H&P  ________________________________________________________________________  Care Plan discussed with:    Comments   Patient x    Family  x    RN     Care Manager     Consultant                        Multidiciplinary team rounds were held today with , nursing, pharmacist and clinical coordinator. Patient's plan of care was discussed; medications were reviewed and discharge planning was addressed. ________________________________________________________________________  Total NON critical care TIME:  25   Minutes    Total CRITICAL CARE TIME Spent:   Minutes non procedure based      Comments   >50% of visit spent in counseling and coordination of care     ________________________________________________________________________  Jak Quezada MD     Procedures: see electronic medical records for all procedures/Xrays and details which were not copied into this note but were reviewed prior to creation of Plan. LABS:  I reviewed today's most current labs and imaging studies.   Pertinent labs include:  Recent Labs     12/23/19  0302   WBC 10.9   HGB 11.6   HCT 36.0        Recent Labs     12/22/19  0300 12/21/19  0449    141   K 4.2 4.0    106   CO2 31 31   * 116*   BUN 23* 21*   CREA 0.66 0.65   CA 8.3* 8.2*       Signed: Jak Quezada MD

## 2019-12-23 NOTE — PROGRESS NOTES
MELIZA Plan:     *SNF-[facility]   *[facility] to transport @11am Tuesday 12/24   *[address] signed & placed on pt chart    4:45pm  CM phoned pt's daughter to inform her of 11am transport to [facility]. CM also informed MD and facility    4:23pm  [facility] accepted pt. FOC was provided, signed and placed on pt chart. Patient and daughter, have decided that it would be best for pt to go to SNF before returning home. Per request CM will send a referral to [address] [place] [address]. CM will continue to follow.     Carmel Even  Ext [phone]

## 2019-12-23 NOTE — PROGRESS NOTES
Problem: Mobility Impaired (Adult and Pediatric)  Goal: *Acute Goals and Plan of Care (Insert Text)  Description  . FUNCTIONAL STATUS PRIOR TO ADMISSION: Patient was modified independent using a single point cane for functional mobility. HOME SUPPORT PRIOR TO ADMISSION: The patient lived alone with daughter to provide assistance. Daughter traveled to pt's home weekly. Physical Therapy Goals  Initiated 12/20/2019  1. Patient will move from supine to sit and sit to supine , scoot up and down and roll side to side in bed with independence within 7 day(s). 2.  Patient will transfer from bed to chair and chair to bed with independence using the least restrictive device within 7 day(s). 3.  Patient will perform sit to stand with modified independence within 7 day(s). 4.  Patient will ambulate with stand by assist for 150 feet with the least restrictive device within 7 day(s). 5.  Patient will ascend/descend 14 stairs with 1 handrail(s) with stand by assist within 7 day(s). Outcome: Not Progressing Towards Goal   PHYSICAL THERAPY TREATMENT  Patient: Foster Marks (66 y.o. female)  Date: 12/23/2019  Diagnosis: Bilateral lower leg cellulitis [L03.116, N41.397]   <principal problem not specified>       Precautions: Fall  Chart, physical therapy assessment, plan of care and goals were reviewed. ASSESSMENT  Patient continues with skilled PT services and is not progressing towards goals. Pt presents with decreased strength and balance. Pt requiring a a brief for mobility due to incontinence. Pt able to mobilize in bed at [facility] [place] Pt able to stand at [facility] with SPC and HHA x1. Pt able to take steps over to recliner with SPC and HHA x1. Pt cued to increased knee flexion to improve foot clearance with steppage gait. Pt unable to sandra braces due to cellulitis. Pt declined an further ambulation but agreeable to seated exercises. Pt able to perform.           Current Level of Function Impacting Discharge (mobility/balance): bed mobility at CGA/SBA, sit to stand transfer CGA, ambulation with SPC and      Other factors to consider for discharge: lives alone, impaired memory, decreased safety, need assistance for safe mobiliyt. PLAN :  Patient continues to benefit from skilled intervention to address the above impairments. Continue treatment per established plan of care. to address goals. Recommendation for discharge: (in order for the patient to meet his/her long term goals)  Therapy up to 5 days/week in SNF setting or intensive home health therapy program with 24/7 supervision     This discharge recommendation:  Has been made in collaboration with the attending provider and/or case management    IF patient discharges home will need the following DME: patient owns DME required for discharge       SUBJECTIVE:   Patient stated  I think this is good for now.     OBJECTIVE DATA SUMMARY:   Critical Behavior:  Neurologic State: Alert  Orientation Level: Oriented X4  Cognition: Follows commands, Appropriate decision making, Appropriate safety awareness  Safety/Judgement: Decreased awareness of environment, Decreased awareness of need for assistance, Decreased awareness of need for safety, Fall prevention  Functional Mobility Training:  Bed Mobility:  Rolling: Contact guard assistance;Stand-by assistance  Supine to Sit: Contact guard assistance;Stand-by assistance     Scooting: Stand-by assistance        Transfers:  Sit to Stand: Contact guard assistance  Stand to Sit: Contact guard assistance                             Balance:  Sitting: Intact  Standing: Impaired  Standing - Static: Fair;Constant support  Standing - Dynamic : Poor;Fair;Constant support  Ambulation/Gait Training:  Distance (ft): 5 Feet (ft)  Assistive Device: Gait belt;Walker, rolling  Ambulation - Level of Assistance: Minimal assistance        Gait Abnormalities: Decreased step clearance; Steppage gait        Base of Support: Narrowed  Stance: Left decreased  Speed/Jannet: Slow  Step Length: Right shortened;Left shortened           Therapeutic Exercises:   Seated  LAQ x10  Marching x10  Ankle pumps x10  Pain Rating:  Pt with no complaints of pain     Activity Tolerance:   Fair   Please refer to the flowsheet for vital signs taken during this treatment.     After treatment patient left in no apparent distress:   Sitting in chair, Call bell within reach, and Bed / chair alarm activated    COMMUNICATION/COLLABORATION:   The patients plan of care was discussed with: Registered Nurse    Ann Marie Pryor PTA   Time Calculation: 24 mins

## 2019-12-23 NOTE — PROGRESS NOTES
General Surgery End of Shift Nursing Note    Bedside shift change report given to Millie Ryan (oncoming nurse) by Enrico Pagan (offgoing nurse). Report included the following information SBAR, Kardex, Procedure Summary, Intake/Output, MAR, Accordion, Recent Results and Med Rec Status. Shift worked:   7pm-7am   Summary of shift:    Patient rested well this shift, no complaints of pain. Patient remained incontinent of urine throughout shift, purewick successful most of the shift but one incidence of incontinence that was unmeasurable.    Issues for physician to address:   none           [address]

## 2019-12-24 VITALS
HEART RATE: 66 BPM | WEIGHT: 116 LBS | DIASTOLIC BLOOD PRESSURE: 52 MMHG | RESPIRATION RATE: 16 BRPM | SYSTOLIC BLOOD PRESSURE: 123 MMHG | BODY MASS INDEX: 18.64 KG/M2 | HEIGHT: 66 IN | OXYGEN SATURATION: 100 % | TEMPERATURE: 97.6 F

## 2019-12-24 LAB
GLUCOSE BLD STRIP.AUTO-MCNC: 118 MG/DL (ref 65–100)
SERVICE CMNT-IMP: ABNORMAL

## 2019-12-24 PROCEDURE — 74011250637 HC RX REV CODE- 250/637: Performed by: INTERNAL MEDICINE

## 2019-12-24 PROCEDURE — 74011250636 HC RX REV CODE- 250/636: Performed by: INTERNAL MEDICINE

## 2019-12-24 PROCEDURE — 82962 GLUCOSE BLOOD TEST: CPT

## 2019-12-24 RX ADMIN — RALOXIFENE HYDROCHLORIDE 60 MG: 60 TABLET, FILM COATED ORAL at 08:49

## 2019-12-24 RX ADMIN — LORAZEPAM 0.25 MG: 0.5 TABLET ORAL at 08:42

## 2019-12-24 RX ADMIN — CALCIUM POLYCARBOPHIL 625 MG: 625 TABLET, FILM COATED ORAL at 08:49

## 2019-12-24 RX ADMIN — CALCIUM 500 MG: 500 TABLET ORAL at 08:42

## 2019-12-24 RX ADMIN — ATORVASTATIN CALCIUM 10 MG: 10 TABLET, FILM COATED ORAL at 08:42

## 2019-12-24 RX ADMIN — DOCUSATE SODIUM 100 MG: 100 CAPSULE, LIQUID FILLED ORAL at 08:42

## 2019-12-24 RX ADMIN — CYANOCOBALAMIN TAB 500 MCG 1000 MCG: 500 TAB at 08:42

## 2019-12-24 RX ADMIN — POLYETHYLENE GLYCOL (3350) 17 G: 17 POWDER, FOR SOLUTION ORAL at 08:42

## 2019-12-24 RX ADMIN — HEPARIN SODIUM 5000 UNITS: 5000 INJECTION INTRAVENOUS; SUBCUTANEOUS at 08:42

## 2019-12-24 RX ADMIN — Medication 10 ML: at 05:30

## 2019-12-24 RX ADMIN — LISINOPRIL 40 MG: 40 TABLET ORAL at 08:42

## 2019-12-24 NOTE — PROGRESS NOTES
General Surgery End of Shift Nursing Note    Bedside shift change report given to Emerita Wisdom  (oncoming nurse) by Yo Ellis (offgoing nurse). Report included the following information SBAR, Kardex, Procedure Summary, Intake/Output, MAR and Recent Results. Shift worked:   7a-7p   Summary of shift:    No complaints of pain. Pt tolerating diet with good appetite. Pt up to chair with PT. Dressings changed to BLE. Issues for physician to address:        Number times ambulated in hallway past shift: 0    Number of times OOB to chair past shift: 0      GI:    Current diet:  DIET NUTRITIONAL SUPPLEMENTS No; Lunch; Glucerna Shake  DIET ONE TIME MESSAGE  DIET REGULAR No Conc.  Sweets    Tolerating current diet: YES  Passing flatus: YES  Last Bowel Movement: yesterday              Manasa Davis
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
## 2019-12-24 NOTE — PROGRESS NOTES
Transition of Care Plan to SNF/Rehab    SNF/Rehab Transition:  Patient has been accepted to [facility] and meets criteria for admission. Patient will transported by [facility] transportation and expected to leave at 11:00 AM.     Communication to Patient/Family:  Met with patient and spoke with daughter and they are agreeable to the transition plan. Communication to SNF/Rehab:  Bedside RN, Anita Rodriguez has been notified to update the transition plan to the facility and call report (phone number [phone]). Discharge information has been updated on the AVS.     Discharge instructions to be fax'd to facility at [facility] # [phone]). SNF/Rehab Transition:  Patient to follow-up with Home Health: No specific agency   PCP/Specialist: See S   Ambulatory Care Management: NA    Reviewed and confirmed with facility, [facility], they can manage the patient care needs for the following:     Palmira Hernandez with (X) only those applicable:    Medication:  [x]  Medications will be available at the facility  []  IV Antibiotics   [x]  Controlled Substance - hard copy to be sent with patient   []  Weekly Labs   Documents:  [x] Hard RX  [x] MAR  [x] Kardex  [x] AVS  [x]Transfer Summary  [x]Discharge   Equipment:  []  CPAP/BiPAP  []  Wound Vacuum  []  Goldberg or Urinary Device  []  PICC/Central Line  []  Nebulizer  []  Ventilator   Treatment:  []Isolation (for MRSA, VRE, etc.)  []Surgical Drain Management  []Tracheostomy Care  [x]Dressing Changes  []Dialysis with transportation and chair time   []PEG Care  []Oxygen  []Daily Weights for Heart Failure   Dietary:  [x]Any diet limitations- Diabetic Diet   []Tube Feedings   []Total Parenteral Management (TPN)   Eligible for Medicaid Long Term Services and Supports  Yes:  [] Eligible for medical assistance or will become eligible within 180 days and UAI completed. [] Provider/Patient and/or support system has requested screening.   [] UAI copy provided to patient or responsible party  [] UAI unavailable at discharge will send once processed to SNF provider. [] UAI unavailable at discharged mailed to patient  No:   [] Private pay and is not financially eligible for Medicaid within the next 180 days. [] Reside out-of-state.   [] A residents of a state owned/operated facility that is licensed  by [address] [employer] [facility] or [facility]  [] Enrollment in [facility] services  [] [address] [place] [address] citizen  [x] Patient /Family declines to have screening completed or provide financial information for screening     Financial Resources:  Medicaid    [] Initiated and application pending   [] Full coverage     Advanced Care Plan:  []Surrogate Decision Maker of Care  []POA  [x]Communicated Code Status- DNR    Other        Erik Schwab, MSW, CM   [facility]   [phone]

## 2019-12-24 NOTE — PROGRESS NOTES
Problem: Falls - Risk of  Goal: *Absence of Falls  Description  Document Sunshine martin Fall Risk and appropriate interventions in the flowsheet. Outcome: Progressing Towards Goal  Note: Fall Risk Interventions:  Mobility Interventions: Patient to call before getting OOB, PT Consult for mobility concerns    Mentation Interventions: Door open when patient unattended    Medication Interventions: Teach patient to arise slowly    Elimination Interventions: Patient to call for help with toileting needs    History of Falls Interventions: Room close to nurse's station         Problem: Patient Education: Go to Patient Education Activity  Goal: Patient/Family Education  Outcome: Progressing Towards Goal     Problem: Patient Education: Go to Patient Education Activity  Goal: Patient/Family Education  Outcome: Progressing Towards Goal     Problem: Pressure Injury - Risk of  Goal: *Prevention of pressure injury  Description  Document Sumeet Scale and appropriate interventions in the flowsheet.   Outcome: Progressing Towards Goal  Note: Pressure Injury Interventions:  Sensory Interventions: Keep linens dry and wrinkle-free    Moisture Interventions: Internal/External urinary devices, Limit adult briefs, Maintain skin hydration (lotion/cream)    Activity Interventions: Increase time out of bed, Pressure redistribution bed/mattress(bed type)    Mobility Interventions: HOB 30 degrees or less, Pressure redistribution bed/mattress (bed type)    Nutrition Interventions: Document food/fluid/supplement intake    Friction and Shear Interventions: HOB 30 degrees or less                Problem: Patient Education: Go to Patient Education Activity  Goal: Patient/Family Education  Outcome: Progressing Towards Goal

## 2019-12-24 NOTE — PROGRESS NOTES
Hospital to [address] [place]                                                                        81 y.o.   female    [facility]   Room: 2132/01    [facility] [place] 2 GENERAL SURGERY  Unit Phone# :  [phone]      [address]  [facility] [place] [address] [address]  Dept: [phone]  Loc: [phone]                    SITUATION     Admitted:  12/16/2019         Attending Provider:  Pato Crook MD       Consultations:  IP CONSULT TO VASCULAR SURGERY    PCP:  Eliza Crespo MD   [phone]    Treatment Team: Attending Provider: Pato Crook MD; Consulting Provider: Pato Crook MD; Utilization Review: Braden Bhatti RN; Care Manager: Zhen Roman; Primary Nurse: Gerardo Hackett; Care Manager: Karrie Brothers    Admitting Dx:  Bilateral lower leg cellulitis [Y31.410, Y17.217]         * No surgery found * of      BY: * Surgery not found *             ON: * No surgery found *                  Code Status: DNR                Advance Directives:   Advance Care Planning 12/17/2019   Confirm Advance Directive Yes, not on file    (Send w/patient)   No Doesnt Have       Isolation:  There are currently no Active Isolations       MDRO: No current active infections        Special Equipment needed: yes  Type of equipment: Leg braces         BACKGROUND     Allergies:  No Known Allergies    Past Medical History:   Diagnosis Date    Depression     Diabetes ([address]     Hypercholesteremia        No past surgical history on file. Medications Prior to Admission   Medication Sig    metFORMIN (GLUMETZA ER) 500 mg TG24 24 hour tablet Take 500 mg by mouth daily.  nortriptyline (PAMELOR) 25 mg capsule Take 25 mg by mouth daily (with dinner).  nortriptyline (PAMELOR) 25 mg capsule Take 75 mg by mouth nightly.  lisinopril (PRINIVIL, ZESTRIL) 20 mg tablet Take 40 mg by mouth daily.     risperiDONE (RISPERDAL) 0.25 mg tablet Take 0.25 mg by mouth every other day.  torsemide (DEMADEX) 5 mg tablet Take 5 mg by mouth every evening.  cephALEXin (KEFLEX) 500 mg capsule Take 500 mg by mouth two (2) times a day.  trimethoprim-sulfamethoxazole (BACTRIM DS) 160-800 mg per tablet Take 1 Tab by mouth two (2) times a day.  LORazepam (ATIVAN) 0.5 mg tablet Take 0.25 mg by mouth two (2) times a day.  raloxifene (EVISTA) 60 mg tablet Take 60 mg by mouth daily.  calcium carbonate (CALCIUM 500 PO) Take 1 Tab by mouth daily.  docusate sodium (COLACE) 100 mg capsule Take 100 mg by mouth two (2) times a day.  aspirin delayed-release 81 mg tablet Take 1 Tab by mouth every other day.  calcium polycarbophil (FIBERCON PO) Take 1 Tab by mouth two (2) times a day.  cyanocobalamin 1,000 mcg tablet Take 1,000 mcg by mouth daily.  alpha-D-galactosidase (BEANO PO) Take 1 Tab by mouth daily.  atorvastatin (LIPITOR) 10 mg tablet Take 10 mg by mouth daily. Hard scripts included in transfer packet no    Vaccinations: There is no immunization history on file for this patient. The Charlson CoMorbitiy Index tool is an evidenced based tool that has more automatic generated information. The tool looks at many different items such as the age of the patient, how many times they were admitted in the last calendar year, current length of stay in the hospital and their diagnosis. All of these items are pulled automatically from information documented in the chart from various places and will generate a score that predicts whether a patient is at low (less than 13), medium (13-20) or high (21 or greater) risk of being readmitted.         ASSESSMENT                Temp: 97.6 °F (36.4 °C) (12/24/19 0739) Pulse (Heart Rate): 66 (12/24/19 0739)     Resp Rate: 16 (12/24/19 0739)           BP: 123/52 (12/24/19 0739)     O2 Sat (%): 100 % (12/24/19 0739)     Weight: 52.6 kg (116 lb)    Height: 5' 6\" (167.6 cm) (12/21/19 0925)       If above not within 1 hour of discharge:    BP:_____  P:____  R:____ T:_____ O2 Sat: ___%  O2: ______    Active Orders   Diet    DIET REGULAR No Conc.  Sweets         Orientation: A x 0 4    Active Behaviors: pleasant                                        Urinary Status: Voiding, External catheter     Last BM: Last Bowel Movement Date: 12/22/19     Skin Integrity: Other (comment)(BLE cellulitis )             Mobility: Slightly limited   Weight Bearing Status: WBAT (Weight Bearing as Tolerated)      Gait Training  Assistive Device: Gait belt, Walker, rolling  Ambulation - Level of Assistance: Minimal assistance  Distance (ft): 5 Feet (ft)         Lab Results   Component Value Date/Time    Glucose 112 (H) 12/22/2019 03:00 AM    Hemoglobin A1c 5.6 12/19/2019 01:33 AM    HGB 11.6 12/23/2019 03:02 AM    HGB 10.6 (L) 12/17/2019 02:49 AM        RECOMMENDATION     See After Visit Summary (AVS) for:  · Discharge instructions  · After [address]   · Special equipment needed (entered pre-discharge by Care Management)  · Medication Reconciliation    · Follow up Appointment(s)         Report given/sent by:  Luis M Hernandez                    Verbal report given to: Shama Bustillos           Estimated discharge time:  12/24/2019 at 11 am

## 2019-12-24 NOTE — DISCHARGE INSTRUCTIONS
HOSPITALIST DISCHARGE INSTRUCTIONS    NAME: Sheila Kuhn   :  1935   MRN:  [id number]     Date/Time:  2019 10:26 AM    ADMIT DATE: 2019   DISCHARGE DATE: 2019     Attending Physician: Laura Mayo MD    DISCHARGE DIAGNOSIS:  Bilateral lower extremity cellulitis and LE ulcers, failed outpatient management   in the setting of Diabetes mellitus POA  h/o chronic foot drop with prosthesis POA  Diabetes mellitus type 2 POA- controlled  -Abdominal distention/abnormal exam  Hypertension  Dyslipidemia  Depression  Edema of both legs    Medications: Per above medication reconciliation. Pain Management: per above medications    Recommended diet: Regular Diet    Recommended activity: Activity as tolerated    Wound care: Preliminary Wound care for the bilateral legs:  Cleanse the leg with CHG soap (not the wipes), wiping them with washcloth to exfoliate the skin and feet, including between the toes. Rinse with water and dry the skin well. Apply the Moisturizer (purple tube) and allow it to absorb into the skin well. For the open ulcers - apply xeroform gauze and then 4x4's and wrap with darci. Indwelling devices:  None    Supplemental Oxygen: None    Required Lab work: Per SNF routine    Glucose management:  Accucheck ACHS with sliding scale per SNF protocol    Code status: DNR        Outside physician follow up:     Follow-up Information     Follow up With Specialties Details Why Contact Info    Berl Gosselin, MD  On 2020 @ 07:30 am.  Nothing to eat or drink after midnight on 2020.   [address]  [phone]    Berl Gosselin, MD Vascular Surgery On 2/3/2020 @ 1:30 pm  [address] [place] [address] 13      Gisele Hoffman MD [place] [address] [address] [place] [address] 100 Abdullahi Castellon MD Family Practice Schedule an appointment as soon as possible for a visit in 1 week  [address] [place] [address]  [phone]                 Skilled nursing facility/ SNF MD responsible for above on discharge. Information obtained by :  I understand that if any problems occur once I am at home I am to contact my physician. I understand and acknowledge receipt of the instructions indicated above.                                                                                                                                            Physician's or R.N.'s Signature                                                                  Date/Time                                                                                                                                              Patient or Repres

## 2019-12-24 NOTE — PROGRESS NOTES
General Surgery End of Shift Nursing Note    Bedside shift change report given to [address] (oncoming nurse) by Savannah Baez (offgoing nurse). Report included the following information SBAR, Kardex, Procedure Summary, Intake/Output, MAR, Accordion and Recent Results. Shift worked:   7pm-7am   Summary of shift:    Patient was up in the chair at start of shift, patient was assisted back into bed with 2 person assist due to inability to wear leg braces. Dual skin check completed with RN Neymar Mendez, patients sacrum red and minimally blanchable at this time, applied barrier cream and education provided to patient regarding turning and repositioning to prevent skin breakdown, she verbalized understanding and was agreeable. Patient slept well throughout the night and remained incontinent of urine. Call received from patients daughter at 1 Am regarding patients d/c plan for the day, education provided and notified her that plan is for D/C to [facility] and rehab at 11 am in the morning, she verbalized understanding and questioned if she needed to come transfer all of patients belongings, education provided that patients belongings could go with her and did suggest brining patient some clothes upon arrival to facility, she verbalized understating and reported no further concerns.     Issues for physician to address:   none         [address]

## 2020-01-03 ENCOUNTER — PATIENT OUTREACH (OUTPATIENT)
Dept: CASE MANAGEMENT | Age: 85
End: 2020-01-03

## 2020-01-07 ENCOUNTER — TELEPHONE (OUTPATIENT)
Dept: GERIATRIC MEDICINE | Age: 85
End: 2020-01-07

## 2020-01-07 NOTE — TELEPHONE ENCOUNTER
Patient admitted at [facility] and [address] [place] [address]. Reviewed her chart in relation to dosing for medications and reconciliation.

## 2020-01-10 ENCOUNTER — PATIENT OUTREACH (OUTPATIENT)
Dept: CASE MANAGEMENT | Age: 85
End: 2020-01-10

## 2020-01-10 NOTE — PROGRESS NOTES
Community Care Team Documentation for Patient in [facility]  Subsequent Follow up     Patient remains at [address] ([facility]). See previous [facility] Team notes. Spoke with SNF team.   SNF Medical update: AFO not fitting patient due to edema. PT/OT update: Currently bed mobility with supervision, transfers with min assist, ambulating 60 ft using RW with min assist, UB ADL's with supervision, LB ADL's and toileting with min assist.   LOS/ Disposition: Plan for discharge on 1/23 home alone. Medications were not reconciled and general patient assessment was not completed during this skilled nursing facility outreach. MISSY JoelN, [address] Team  [phone]

## 2020-01-17 ENCOUNTER — PATIENT OUTREACH (OUTPATIENT)
Dept: CASE MANAGEMENT | Age: 85
End: 2020-01-17

## 2020-01-17 NOTE — PROGRESS NOTES
Community Care Team Documentation for Patient in [facility]  Subsequent Follow up     Patient remains at [address] ([facility]). See previous [facility] Team notes. Spoke with SNF team.   PT/OT update: Currently bed movility with supervision, transfers with CGA, ambulating 100 ft with RW and CGA, AFO started, UB with supervision, LB ADL's Mod I, toileting with supervision. LOS/ Disposition:  Plan for DC on 1/30 home wiht either [facility] or [address] [place] [address]. Medications were not reconciled and general patient assessment was not completed during this skilled nursing facility outreach.      Maxim Griffiths [facility], [address] [facility]

## 2020-01-24 ENCOUNTER — PATIENT OUTREACH (OUTPATIENT)
Dept: CASE MANAGEMENT | Age: 85
End: 2020-01-24

## 2020-01-31 ENCOUNTER — PATIENT OUTREACH (OUTPATIENT)
Dept: CASE MANAGEMENT | Age: 85
End: 2020-01-31

## 2020-01-31 NOTE — PROGRESS NOTES
Community Care Team Documentation for Patient in [facility]  Subsequent Follow up      Patient remains at [address] ([facility]).    See previous Community Care Team notes.     Spoke with SNF team.  PT/OT update: Currently bed mobility with Mod assist; transfers with SUPV, ambulating  ft using RW with SUPV, 3 stairs with min to mod assist, UB ADL's with Mod assist, LB ADL's and toileting with mod assist   LOS/ Disposition: Plan for discharge on on 216 home using [facility].     Medications were not reconciled and general patient assessment was not completed during this skilled nursing facility outreach.      Hersey Jude, Sherwin Collet, [address] Team  [phone]

## 2020-02-07 ENCOUNTER — PATIENT OUTREACH (OUTPATIENT)
Dept: CASE MANAGEMENT | Age: 85
End: 2020-02-07

## 2020-02-08 NOTE — PROGRESS NOTES
Community Care Team Documentation for Patient in [facility]  Discharge Note    Patient has been discharged from [address] ([facility]). See previous [facility] Team notes. PCP : Macky Kussmaul, MD    Spoke with SNF team.    LOS/ Disposition: Patient discharged to home on 2/6 with [facility]. Community Care Team will sign off at this time. Medications were not reconciled and general patient assessment was not completed during this skilled nursing facility outreach.      Brown Schirmer RN-CCT  [phone]

## 2021-10-17 ENCOUNTER — HOSPITAL ENCOUNTER (EMERGENCY)
Age: 86
Discharge: HOME | End: 2021-10-18
Attending: EMERGENCY MEDICINE
Payer: MEDICARE

## 2021-10-17 DIAGNOSIS — F03.90 DEMENTIA WITHOUT BEHAVIORAL DISTURBANCE, UNSPECIFIED DEMENTIA TYPE: ICD-10-CM

## 2021-10-17 DIAGNOSIS — S00.431A HEMATOMA OF RIGHT AURICULAR REGION: Primary | ICD-10-CM

## 2021-10-17 LAB
ALBUMIN SERPL-MCNC: 3.2 G/DL (ref 3.5–5)
ALBUMIN/GLOB SERPL: 0.7 {RATIO} (ref 1.1–2.2)
ALP SERPL-CCNC: 114 U/L (ref 45–117)
ALT SERPL-CCNC: 26 U/L (ref 12–78)
ANION GAP SERPL CALC-SCNC: 4 MMOL/L (ref 5–15)
APTT PPP: 28.5 SEC (ref 22.1–31)
AST SERPL-CCNC: 18 U/L (ref 15–37)
BASOPHILS # BLD: 0.1 K/UL (ref 0–0.1)
BASOPHILS NFR BLD: 0 % (ref 0–1)
BILIRUB SERPL-MCNC: 0.5 MG/DL (ref 0.2–1)
BUN SERPL-MCNC: 16 MG/DL (ref 6–20)
BUN/CREAT SERPL: 30 (ref 12–20)
CALCIUM SERPL-MCNC: 9.3 MG/DL (ref 8.5–10.1)
CHLORIDE SERPL-SCNC: 101 MMOL/L (ref 97–108)
CO2 SERPL-SCNC: 30 MMOL/L (ref 21–32)
CREAT SERPL-MCNC: 0.53 MG/DL (ref 0.55–1.02)
DIFFERENTIAL METHOD BLD: ABNORMAL
EOSINOPHIL # BLD: 0.4 K/UL (ref 0–0.4)
EOSINOPHIL NFR BLD: 3 % (ref 0–7)
ERYTHROCYTE [DISTWIDTH] IN BLOOD BY AUTOMATED COUNT: 12.4 % (ref 11.5–14.5)
GLOBULIN SER CALC-MCNC: 4.9 G/DL (ref 2–4)
GLUCOSE BLD STRIP.AUTO-MCNC: 121 MG/DL (ref 65–117)
GLUCOSE SERPL-MCNC: 133 MG/DL (ref 65–100)
HCT VFR BLD AUTO: 37.4 % (ref 35–47)
HGB BLD-MCNC: 12.5 G/DL (ref 11.5–16)
IMM GRANULOCYTES # BLD AUTO: 0 K/UL (ref 0–0.04)
IMM GRANULOCYTES NFR BLD AUTO: 0 % (ref 0–0.5)
INR PPP: 1 (ref 0.9–1.1)
LYMPHOCYTES # BLD: 2.2 K/UL (ref 0.8–3.5)
LYMPHOCYTES NFR BLD: 18 % (ref 12–49)
MCH RBC QN AUTO: 32 PG (ref 26–34)
MCHC RBC AUTO-ENTMCNC: 33.4 G/DL (ref 30–36.5)
MCV RBC AUTO: 95.7 FL (ref 80–99)
MONOCYTES # BLD: 1 K/UL (ref 0–1)
MONOCYTES NFR BLD: 8 % (ref 5–13)
NEUTS SEG # BLD: 8.5 K/UL (ref 1.8–8)
NEUTS SEG NFR BLD: 71 % (ref 32–75)
NRBC # BLD: 0 K/UL (ref 0–0.01)
NRBC BLD-RTO: 0 PER 100 WBC
PLATELET # BLD AUTO: 351 K/UL (ref 150–400)
PMV BLD AUTO: 8.7 FL (ref 8.9–12.9)
POTASSIUM SERPL-SCNC: 4.3 MMOL/L (ref 3.5–5.1)
PROT SERPL-MCNC: 8.1 G/DL (ref 6.4–8.2)
PROTHROMBIN TIME: 10.6 SEC (ref 9–11.1)
RBC # BLD AUTO: 3.91 M/UL (ref 3.8–5.2)
SERVICE CMNT-IMP: ABNORMAL
SODIUM SERPL-SCNC: 135 MMOL/L (ref 136–145)
THERAPEUTIC RANGE,PTTT: NORMAL SECS (ref 58–77)
TROPONIN-HIGH SENSITIVITY: 16 NG/L (ref 0–51)
WBC # BLD AUTO: 12.2 K/UL (ref 3.6–11)

## 2021-10-17 PROCEDURE — 75810000289 HC I&D ABSCESS SIMP/COMP/MULT

## 2021-10-17 PROCEDURE — 36415 COLL VENOUS BLD VENIPUNCTURE: CPT

## 2021-10-17 PROCEDURE — 99284 EMERGENCY DEPT VISIT MOD MDM: CPT

## 2021-10-17 PROCEDURE — 82962 GLUCOSE BLOOD TEST: CPT

## 2021-10-17 PROCEDURE — 85610 PROTHROMBIN TIME: CPT

## 2021-10-17 PROCEDURE — 85025 COMPLETE CBC W/AUTO DIFF WBC: CPT

## 2021-10-17 PROCEDURE — 93005 ELECTROCARDIOGRAM TRACING: CPT

## 2021-10-17 PROCEDURE — 80053 COMPREHEN METABOLIC PANEL: CPT

## 2021-10-17 PROCEDURE — 84484 ASSAY OF TROPONIN QUANT: CPT

## 2021-10-17 PROCEDURE — 85730 THROMBOPLASTIN TIME PARTIAL: CPT

## 2021-10-18 ENCOUNTER — APPOINTMENT (OUTPATIENT)
Dept: CT IMAGING | Age: 86
End: 2021-10-18
Attending: EMERGENCY MEDICINE
Payer: MEDICARE

## 2021-10-18 VITALS
HEIGHT: 67 IN | HEART RATE: 72 BPM | RESPIRATION RATE: 18 BRPM | OXYGEN SATURATION: 98 % | BODY MASS INDEX: 19.62 KG/M2 | TEMPERATURE: 98.7 F | SYSTOLIC BLOOD PRESSURE: 184 MMHG | WEIGHT: 125 LBS | DIASTOLIC BLOOD PRESSURE: 69 MMHG

## 2021-10-18 LAB
ATRIAL RATE: 68 BPM
CALCULATED P AXIS, ECG09: 59 DEGREES
CALCULATED R AXIS, ECG10: 90 DEGREES
CALCULATED T AXIS, ECG11: 13 DEGREES
DIAGNOSIS, 93000: NORMAL
P-R INTERVAL, ECG05: 182 MS
Q-T INTERVAL, ECG07: 420 MS
QRS DURATION, ECG06: 130 MS
QTC CALCULATION (BEZET), ECG08: 446 MS
VENTRICULAR RATE, ECG03: 68 BPM

## 2021-10-18 PROCEDURE — 74011000250 HC RX REV CODE- 250: Performed by: EMERGENCY MEDICINE

## 2021-10-18 PROCEDURE — 70450 CT HEAD/BRAIN W/O DYE: CPT

## 2021-10-18 PROCEDURE — 75810000289 HC I&D ABSCESS SIMP/COMP/MULT

## 2021-10-18 RX ORDER — AMOXICILLIN 500 MG/1
500 TABLET, FILM COATED ORAL 3 TIMES DAILY
Qty: 15 TABLET | Refills: 0 | Status: SHIPPED | OUTPATIENT
Start: 2021-10-18 | End: 2021-10-23

## 2021-10-18 RX ADMIN — Medication 2 ML: at 01:20

## 2021-10-18 NOTE — ED NOTES
Pt's right ear has a pressure dressing. Pt's daughter is educated with demonstration on how to change the dressing at home, wound care supplies provided.

## 2021-10-18 NOTE — DISCHARGE INSTRUCTIONS
You should take amoxicillin 3 times a day to prevent infections in the right ear. Continue to maintain gentle pressure on the ear to help express any blood out of it. You should follow-up with an ear nose and throat specialist if the pain or swelling bothers you. Your laboratories and CT scan today are reassuring and we do not see signs of any significant injuries. Continue to use a walker at home when possible to avoid falls.

## 2021-10-18 NOTE — ED PROVIDER NOTES
EMERGENCY DEPARTMENT HISTORY AND PHYSICAL EXAM      Date: 10/17/2021  Patient Name: Alex Wray  Patient Age and Sex: 81 y.o. female  MRN:  [id number]  CSN:  [id number]    History of Presenting Illness     Chief Complaint   Patient presents with    Ear Pain     ED visit d/t (R) ear pain and swelling s/p GLF - also reports having decreased urinary output / dizziness through this day - hx of T2DM - A/Ox4;;        History Provided By: Patient and Patient's Daughter    Ability to gather history was limited by:     HPI: Alex Wray, 81 y.o. female with history of dementia brought to emergency department by her daughter due to ground-level fall earlier today, few hours ago, in which patient struck her head against the floor. Secondary to dementia, patient is unable to provide history and does not complain of any pain. She is not anticoagulated. Ported headache. Location:    Quality:      Severity:    Duration:   Timing:      Context:    Modifying factors:   Associated symptoms:     Past History      The patient's medical, surgical, and social history on file were reviewed by me today.  The family history was reviewed by me today and was non-contributory, unless otherwise specified below:    Past Medical History:  Past Medical History:   Diagnosis Date    Depression     Diabetes ([facility] [address]     Hypercholesteremia        Past Surgical History:  No past surgical history on file. Family History:  Family History   Problem Relation Age of Onset    Dementia Maternal Grandmother        Social History:  Social History     Tobacco Use    Smoking status: Never Smoker    Smokeless tobacco: Never Used   Substance Use Topics    Alcohol use: No    Drug use: Not on file       Current Medications:  No current facility-administered medications on file prior to encounter.      Current Outpatient Medications on File Prior to Encounter   Medication Sig Dispense Refill    nortriptyline (PAMELOR) 25 mg capsule Take 25 mg by mouth daily (with dinner).  nortriptyline (PAMELOR) 25 mg capsule Take 75 mg by mouth nightly.  lisinopril (PRINIVIL, ZESTRIL) 20 mg tablet Take 40 mg by mouth daily.  risperiDONE (RISPERDAL) 0.25 mg tablet Take 0.25 mg by mouth every other day.  torsemide (DEMADEX) 5 mg tablet Take 5 mg by mouth every evening.  trimethoprim-sulfamethoxazole (BACTRIM DS) 160-800 mg per tablet Take 1 Tab by mouth two (2) times a day.  LORazepam (ATIVAN) 0.5 mg tablet Take 0.25 mg by mouth two (2) times a day.  raloxifene (EVISTA) 60 mg tablet Take 60 mg by mouth daily.  calcium carbonate (CALCIUM 500 PO) Take 1 Tab by mouth daily.  docusate sodium (COLACE) 100 mg capsule Take 100 mg by mouth two (2) times a day.  aspirin delayed-release 81 mg tablet Take 1 Tab by mouth every other day.  calcium polycarbophil (FIBERCON PO) Take 1 Tab by mouth two (2) times a day.  cyanocobalamin 1,000 mcg tablet Take 1,000 mcg by mouth daily.  alpha-D-galactosidase (BEANO PO) Take 1 Tab by mouth daily.  atorvastatin (LIPITOR) 10 mg tablet Take 10 mg by mouth daily. Allergies:  No Known Allergies  Review of Systems    A complete ROS was reviewed by me today and was negative, unless otherwise specified below:    Review of Systems   Constitutional: Negative for fatigue and fever. Respiratory: Negative for shortness of breath. Cardiovascular: Negative for chest pain. Gastrointestinal: Negative for abdominal pain. Neurological: Negative for headaches. All other systems reviewed and are negative. Physical Exam   Vital Signs  Patient Vitals for the past 8 hrs:   Temp Pulse Resp BP SpO2   10/18/21 0116  72 18 (!) 184/69    10/17/21 2106 98.7 °F (37.1 °C) 72 20 (!) 214/55 98 %          Physical Exam  Vitals and nursing note reviewed. Constitutional:       General: She is not in acute distress. Appearance: Normal appearance. She is well-developed.  She is not ill-appearing. HENT:      Head: Normocephalic. Right Ear: Swelling present. Ears:        Comments: Extensive tense fluctuant auricular hematoma involving the entire auricle     Mouth/Throat:      Mouth: Mucous membranes are moist.   Eyes:      General:         Right eye: No discharge. Left eye: No discharge. Conjunctiva/sclera: Conjunctivae normal.      Pupils: Pupils are equal, round, and reactive to light. Pupils are equal.   Cardiovascular:      Rate and Rhythm: Normal rate and regular rhythm. Heart sounds: Normal heart sounds. No murmur heard. Pulmonary:      Effort: Pulmonary effort is normal. No respiratory distress. Breath sounds: Normal breath sounds. No wheezing. Abdominal:      General: There is no distension. Palpations: Abdomen is soft. Tenderness: There is no abdominal tenderness. Musculoskeletal:         General: No deformity. Normal range of motion. Cervical back: Normal range of motion and neck supple. Skin:     General: Skin is warm and dry. Findings: No rash. Neurological:      General: No focal deficit present. Mental Status: She is alert. She is disoriented. Psychiatric:         Mood and Affect: Affect is blunt and flat. Speech: She is noncommunicative. Behavior: Behavior is slowed and withdrawn. Cognition and Memory: Cognition is impaired. Memory is impaired.          Diagnostic Study Results   Labs  Recent Results (from the past 24 hour(s))   GLUCOSE, POC    Collection Time: 10/17/21  9:04 PM   Result Value Ref Range    Glucose (POC) 121 (H) 65 - 117 mg/dL    Performed by Ann Maxwell ([facility])    EKG, 12 LEAD, INITIAL    Collection Time: 10/17/21  9:16 PM   Result Value Ref Range    Ventricular Rate 68 BPM    Atrial Rate 68 BPM    P-R Interval 182 ms    QRS Duration 130 ms    Q-T Interval 420 ms    QTC Calculation (Bezet) 446 ms    Calculated P Axis 59 degrees    Calculated R Axis 90 degrees Calculated T Axis 13 degrees    Diagnosis       Normal sinus rhythm with sinus arrhythmia  Right bundle branch block  No previous ECGs available     CBC WITH AUTOMATED DIFF    Collection Time: 10/17/21  9:55 PM   Result Value Ref Range    WBC 12.2 (H) 3.6 - 11.0 K/uL    RBC 3.91 3.80 - 5.20 M/uL    HGB 12.5 11.5 - 16.0 g/dL    HCT 37.4 35.0 - 47.0 %    MCV 95.7 80.0 - 99.0 FL    MCH 32.0 26.0 - 34.0 PG    MCHC 33.4 30.0 - 36.5 g/dL    RDW 12.4 11.5 - 14.5 %    PLATELET 784 063 - 620 K/uL    MPV 8.7 (L) 8.9 - 12.9 FL    NRBC 0.0 0  WBC    ABSOLUTE NRBC 0.00 0.00 - 0.01 K/uL    NEUTROPHILS 71 32 - 75 %    LYMPHOCYTES 18 12 - 49 %    MONOCYTES 8 5 - 13 %    EOSINOPHILS 3 0 - 7 %    BASOPHILS 0 0 - 1 %    IMMATURE GRANULOCYTES 0 0.0 - 0.5 %    ABS. NEUTROPHILS 8.5 (H) 1.8 - 8.0 K/UL    ABS. LYMPHOCYTES 2.2 0.8 - 3.5 K/UL    ABS. MONOCYTES 1.0 0.0 - 1.0 K/UL    ABS. EOSINOPHILS 0.4 0.0 - 0.4 K/UL    ABS. BASOPHILS 0.1 0.0 - 0.1 K/UL    ABS. IMM. GRANS. 0.0 0.00 - 0.04 K/UL    DF AUTOMATED     METABOLIC PANEL, COMPREHENSIVE    Collection Time: 10/17/21  9:55 PM   Result Value Ref Range    Sodium 135 (L) 136 - 145 mmol/L    Potassium 4.3 3.5 - 5.1 mmol/L    Chloride 101 97 - 108 mmol/L    CO2 30 21 - 32 mmol/L    Anion gap 4 (L) 5 - 15 mmol/L    Glucose 133 (H) 65 - 100 mg/dL    BUN 16 6 - 20 MG/DL    Creatinine 0.53 (L) 0.55 - 1.02 MG/DL    BUN/Creatinine ratio 30 (H) 12 - 20      GFR est AA >60 >60 ml/min/1.73m2    GFR est non-AA >60 >60 ml/min/1.73m2    Calcium 9.3 8.5 - 10.1 MG/DL    Bilirubin, total 0.5 0.2 - 1.0 MG/DL    ALT (SGPT) 26 12 - 78 U/L    AST (SGOT) 18 15 - 37 U/L    Alk.  phosphatase 114 45 - 117 U/L    Protein, total 8.1 6.4 - 8.2 g/dL    Albumin 3.2 (L) 3.5 - 5.0 g/dL    Globulin 4.9 (H) 2.0 - 4.0 g/dL    A-G Ratio 0.7 (L) 1.1 - 2.2     TROPONIN-HIGH SENSITIVITY    Collection Time: 10/17/21  9:55 PM   Result Value Ref Range    Troponin-High Sensitivity 16 0 - 51 ng/L   PTT    Collection Time: 10/17/21  9:55 PM   Result Value Ref Range    aPTT 28.5 22.1 - 31.0 sec    aPTT, therapeutic range     58.0 - 77.0 SECS   PROTHROMBIN TIME + INR    Collection Time: 10/17/21  9:55 PM   Result Value Ref Range    INR 1.0 0.9 - 1.1      Prothrombin time 10.6 9.0 - 11.1 sec       Radiologic Studies  CT HEAD WO CONT   Final Result   No acute intracranial abnormality. Large right ear hematoma. CT Results  (Last 48 hours)               10/18/21 0011  CT HEAD WO CONT Final result    Impression:  No acute intracranial abnormality. Large right ear hematoma. Narrative:  EXAM:  CT HEAD WO CONT       INDICATION: Fall. Dementia. Anterior injury. COMPARISON: CT 10/2/2019. TECHNIQUE: Axial noncontrast head CT from foramen magnum to vertex. Coronal and   sagittal reformatted images were obtained. CT dose reduction was achieved   through use of a standardized protocol tailored for this examination and   automatic exposure control for dose modulation. FINDINGS:  There is diffuse age-related parenchymal volume loss. The ventricles   and sulci are age-appropriate without hydrocephalus. There is no mass effect or   midline shift. There is no intracranial hemorrhage or extra-axial fluid   collection. Areas of low attenuation in the periventricular white matter   represent stable chronic microvascular ischemic changes. The gray-white matter   differentiation is maintained. The basal cisterns are patent. There is a large right ear hematoma. A left scalp sebaceous cyst is again noted. The osseous structures are intact. The visualized paranasal sinuses and mastoid   air cells are clear.                CXR Results  (Last 48 hours)    None          Billable Procedures   I&D Abcess Complex    Date/Time: 10/18/2021 3:49 AM  Performed by: Magnolia Dotson MD  Authorized by: Magnolia Dotson MD     Consent:     Consent obtained:  Verbal    Consent given by:  Patient and guardian    Risks discussed:  Bleeding, infection and pain    Alternatives discussed:  Alternative treatment  Location:     Type:  Hematoma    Size:  4 cm    Location:  Head    Head/neck location: Right ear. Anesthesia (see MAR for exact dosages): Anesthesia method:  Local infiltration    Local anesthetic:  Lidocaine 1% w/o epi  Procedure type:     Complexity:  Complex  Procedure details:     Incision types:  Single straight    Incision depth:  Subcutaneous    Scalpel blade:  11    Drainage:  Bloody    Drainage amount: Moderate    Wound treatment:  Wound left open    Packing materials:  None  Post-procedure details:     Patient tolerance of procedure: Tolerated well, no immediate complications    EKG    Date/Time: 10/18/2021 3:53 AM  Performed by: Erika Hernandez MD  Authorized by: Erika Hernandez MD     ECG reviewed by ED Physician in the absence of a cardiologist: yes    Interpretation:     Interpretation: non-specific    Rate:     ECG rate assessment: normal    Rhythm:     Rhythm: sinus rhythm    Ectopy:     Ectopy: none    QRS:     QRS axis:  Normal  ST segments:     ST segments:  Normal  T waves:     T waves: normal          Medical Decision Making     I reviewed the patient's most recent Emergency Dept notes and diagnostic tests in formulating my MDM on today's visit. Provider Notes (Medical Decision Making):   81-year-old female with history of dementia who fell against the floor today, struck her ear, causing significant ear swelling. No LOC. On examination she has a large, tense and fluctuant hematoma involving the entire right auricle. She has no other significant injuries. Some minor skin tears. Her neurologic exam appears to be at baseline as far as I can tell. Laboratories and CT scan reassuring, no ICH or other significant injuries. After informed consent, I attempted drainage of the patient's auricular hematoma.  I opted for local anesthetic rather than auricular block as patient had surprisingly minimal pain. 2 separate incisions were made in the most fluctuant intense areas to drain the hematoma, although surprisingly little blood was expressed. Recommend pressure dressing, prophylactic amoxicillin, follow up ENT. Melani Hardy MD  3:46 AM  10/17/2021     Consults:    Social History     Tobacco Use    Smoking status: Never Smoker    Smokeless tobacco: Never Used   Substance Use Topics    Alcohol use: No    Drug use: Not on file       Medications Administered during ED course:  Medications   lidocaine/EPINEPHrine/tetracaine (L.E.T) topical gel (2 mL Topical Given 10/18/21 0120)          Prescriptions from today's ED visit:  Discharge Medication List as of 10/18/2021  1:52 AM      START taking these medications    Details   amoxicillin 500 mg tab Take 500 mg by mouth three (3) times daily for 5 days. , Print, Disp-15 Tablet, R-0         CONTINUE these medications which have NOT CHANGED    Details   !! nortriptyline (PAMELOR) 25 mg capsule Take 25 mg by mouth daily (with dinner). , Historical Med      !! nortriptyline (PAMELOR) 25 mg capsule Take 75 mg by mouth nightly., Historical Med      lisinopril (PRINIVIL, ZESTRIL) 20 mg tablet Take 40 mg by mouth daily. , Historical Med      risperiDONE (RISPERDAL) 0.25 mg tablet Take 0.25 mg by mouth every other day., Historical Med      torsemide (DEMADEX) 5 mg tablet Take 5 mg by mouth every evening., Historical Med      trimethoprim-sulfamethoxazole (BACTRIM DS) 160-800 mg per tablet Take 1 Tab by mouth two (2) times a day., Historical Med      LORazepam (ATIVAN) 0.5 mg tablet Take 0.25 mg by mouth two (2) times a day., Historical Med      raloxifene (EVISTA) 60 mg tablet Take 60 mg by mouth daily. , Historical Med      calcium carbonate (CALCIUM 500 PO) Take 1 Tab by mouth daily. , Historical Med      docusate sodium (COLACE) 100 mg capsule Take 100 mg by mouth two (2) times a day., Historical Med      aspirin delayed-release 81 mg tablet Take 1 Tab by mouth every other day., Historical Med      calcium polycarbophil (FIBERCON PO) Take 1 Tab by mouth two (2) times a day., Historical Med      cyanocobalamin 1,000 mcg tablet Take 1,000 mcg by mouth daily. , Historical Med      alpha-D-galactosidase (BEANO PO) Take 1 Tab by mouth daily. , Historical Med      atorvastatin (LIPITOR) 10 mg tablet Take 10 mg by mouth daily. , Historical Med       !! - Potential duplicate medications found. Please discuss with provider. Diagnosis and Disposition     Disposition:  Discharged    Clinical Impression:   1. Hematoma of right auricular region    2. Dementia without behavioral disturbance, unspecified dementia type [facility])        Attestation:  I personally performed the services described in this documentation on this date 10/17/2021 for patient Xander Giles. Usha Sow MD        I was the first provider for this patient on this visit. To the best of my ability I reviewed relevant prior medical records, electrocardiograms, laboratories, and radiologic studies. The patient's presenting problems were discussed, and the patient was in agreement with the care plan formulated and outlined with them. Usha Sow MD    Please note that this dictation was completed with Dragon voice recognition software. Quite often unanticipated grammatical, syntax, homophones, and other interpretive errors are inadvertently transcribed by the computer software. Please disregard these errors and excuse any errors that have escaped final proofreading.

## 2021-10-18 NOTE — ED NOTES
I have reviewed discharge instructions with the patient/daughter. The patient/daughter verbalized understanding.

## 2022-03-19 PROBLEM — L03.116 BILATERAL LOWER LEG CELLULITIS: Status: ACTIVE | Noted: 2019-12-16

## 2022-03-19 PROBLEM — L03.115 BILATERAL LOWER LEG CELLULITIS: Status: ACTIVE | Noted: 2019-12-16

## 2023-05-10 RX ORDER — ASPIRIN 81 MG/1
1 TABLET ORAL EVERY OTHER DAY
COMMUNITY

## 2023-05-10 RX ORDER — LORAZEPAM 0.5 MG/1
TABLET ORAL 2 TIMES DAILY
COMMUNITY

## 2023-05-10 RX ORDER — TORSEMIDE 5 MG/1
TABLET ORAL EVERY EVENING
COMMUNITY

## 2023-05-10 RX ORDER — LISINOPRIL 20 MG/1
40 TABLET ORAL DAILY
COMMUNITY

## 2023-05-10 RX ORDER — SULFAMETHOXAZOLE AND TRIMETHOPRIM 800; 160 MG/1; MG/1
1 TABLET ORAL 2 TIMES DAILY
COMMUNITY

## 2023-05-10 RX ORDER — ATORVASTATIN CALCIUM 10 MG/1
10 TABLET, FILM COATED ORAL DAILY
COMMUNITY

## 2023-05-10 RX ORDER — RISPERIDONE 0.25 MG/1
TABLET ORAL EVERY OTHER DAY
COMMUNITY

## 2023-05-10 RX ORDER — PSEUDOEPHEDRINE HCL 30 MG
TABLET ORAL 2 TIMES DAILY
COMMUNITY

## 2023-05-10 RX ORDER — NORTRIPTYLINE HYDROCHLORIDE 25 MG/1
CAPSULE ORAL
COMMUNITY

## 2023-05-10 RX ORDER — RALOXIFENE HYDROCHLORIDE 60 MG/1
60 TABLET, FILM COATED ORAL DAILY
COMMUNITY